# Patient Record
Sex: MALE | Race: OTHER
[De-identification: names, ages, dates, MRNs, and addresses within clinical notes are randomized per-mention and may not be internally consistent; named-entity substitution may affect disease eponyms.]

---

## 2019-07-03 ENCOUNTER — HOSPITAL ENCOUNTER (EMERGENCY)
Dept: HOSPITAL 15 - ER | Age: 84
LOS: 1 days | Discharge: HOME | End: 2019-07-04
Payer: MEDICAID

## 2019-07-03 VITALS — BODY MASS INDEX: 25.77 KG/M2 | HEIGHT: 70 IN | WEIGHT: 180 LBS

## 2019-07-03 DIAGNOSIS — I10: ICD-10-CM

## 2019-07-03 DIAGNOSIS — E78.5: ICD-10-CM

## 2019-07-03 DIAGNOSIS — Z79.899: ICD-10-CM

## 2019-07-03 DIAGNOSIS — Z90.49: ICD-10-CM

## 2019-07-03 DIAGNOSIS — Z79.82: ICD-10-CM

## 2019-07-03 DIAGNOSIS — E11.65: ICD-10-CM

## 2019-07-03 DIAGNOSIS — N39.0: Primary | ICD-10-CM

## 2019-07-03 LAB
ALBUMIN SERPL-MCNC: 3.4 G/DL (ref 3.4–5)
ALP SERPL-CCNC: 67 U/L (ref 45–117)
ALT SERPL-CCNC: 24 U/L (ref 16–61)
ANION GAP SERPL CALCULATED.3IONS-SCNC: 8 MMOL/L (ref 5–15)
BILIRUB SERPL-MCNC: 0.2 MG/DL (ref 0.2–1)
BUN SERPL-MCNC: 71 MG/DL (ref 7–18)
BUN/CREAT SERPL: 27.5
CALCIUM SERPL-MCNC: 9.2 MG/DL (ref 8.5–10.1)
CHLORIDE SERPL-SCNC: 95 MMOL/L (ref 98–107)
CO2 SERPL-SCNC: 30 MMOL/L (ref 21–32)
GLUCOSE SERPL-MCNC: 259 MG/DL (ref 74–106)
HCT VFR BLD AUTO: 39.6 % (ref 41–53)
HGB BLD-MCNC: 12.7 G/DL (ref 13.5–17.5)
MCH RBC QN AUTO: 26.3 PG (ref 28–32)
MCV RBC AUTO: 82.2 FL (ref 80–100)
NRBC BLD QL AUTO: 0 %
POTASSIUM SERPL-SCNC: 4.9 MMOL/L (ref 3.5–5.1)
PROT SERPL-MCNC: 8.5 G/DL (ref 6.4–8.2)
SODIUM SERPL-SCNC: 133 MMOL/L (ref 136–145)
WBC CLUMPS UR QL AUTO: PRESENT /HPF

## 2019-07-03 PROCEDURE — 99284 EMERGENCY DEPT VISIT MOD MDM: CPT

## 2019-07-03 PROCEDURE — 80053 COMPREHEN METABOLIC PANEL: CPT

## 2019-07-03 PROCEDURE — 74176 CT ABD & PELVIS W/O CONTRAST: CPT

## 2019-07-03 PROCEDURE — 81001 URINALYSIS AUTO W/SCOPE: CPT

## 2019-07-03 PROCEDURE — 36415 COLL VENOUS BLD VENIPUNCTURE: CPT

## 2019-07-03 PROCEDURE — 96365 THER/PROPH/DIAG IV INF INIT: CPT

## 2019-07-03 PROCEDURE — 85025 COMPLETE CBC W/AUTO DIFF WBC: CPT

## 2019-07-04 VITALS — DIASTOLIC BLOOD PRESSURE: 62 MMHG | SYSTOLIC BLOOD PRESSURE: 146 MMHG

## 2019-08-09 ENCOUNTER — HOSPITAL ENCOUNTER (INPATIENT)
Dept: HOSPITAL 15 - ER | Age: 84
LOS: 3 days | Discharge: HOME HEALTH | DRG: 720 | End: 2019-08-12
Attending: INTERNAL MEDICINE | Admitting: INTERNAL MEDICINE
Payer: MEDICAID

## 2019-08-09 VITALS — BODY MASS INDEX: 24.62 KG/M2 | HEIGHT: 70 IN | WEIGHT: 171.96 LBS

## 2019-08-09 VITALS — SYSTOLIC BLOOD PRESSURE: 144 MMHG | DIASTOLIC BLOOD PRESSURE: 69 MMHG

## 2019-08-09 DIAGNOSIS — Z99.81: ICD-10-CM

## 2019-08-09 DIAGNOSIS — E11.21: ICD-10-CM

## 2019-08-09 DIAGNOSIS — E78.5: ICD-10-CM

## 2019-08-09 DIAGNOSIS — N17.0: ICD-10-CM

## 2019-08-09 DIAGNOSIS — J96.10: ICD-10-CM

## 2019-08-09 DIAGNOSIS — I50.9: ICD-10-CM

## 2019-08-09 DIAGNOSIS — K74.60: ICD-10-CM

## 2019-08-09 DIAGNOSIS — I13.0: ICD-10-CM

## 2019-08-09 DIAGNOSIS — E11.22: ICD-10-CM

## 2019-08-09 DIAGNOSIS — F32.9: ICD-10-CM

## 2019-08-09 DIAGNOSIS — Z95.0: ICD-10-CM

## 2019-08-09 DIAGNOSIS — K21.9: ICD-10-CM

## 2019-08-09 DIAGNOSIS — N40.0: ICD-10-CM

## 2019-08-09 DIAGNOSIS — I25.10: ICD-10-CM

## 2019-08-09 DIAGNOSIS — D63.8: ICD-10-CM

## 2019-08-09 DIAGNOSIS — B96.20: ICD-10-CM

## 2019-08-09 DIAGNOSIS — N18.4: ICD-10-CM

## 2019-08-09 DIAGNOSIS — Z90.49: ICD-10-CM

## 2019-08-09 DIAGNOSIS — E11.65: ICD-10-CM

## 2019-08-09 DIAGNOSIS — N39.0: ICD-10-CM

## 2019-08-09 DIAGNOSIS — A41.9: Primary | ICD-10-CM

## 2019-08-09 DIAGNOSIS — G93.41: ICD-10-CM

## 2019-08-09 LAB
ALBUMIN SERPL-MCNC: 2.6 G/DL (ref 3.4–5)
ALCOHOL, URINE: < 3 MG/DL (ref 0–5)
ALP SERPL-CCNC: 71 U/L (ref 45–117)
ALT SERPL-CCNC: 15 U/L (ref 16–61)
AMPHETAMINES UR QL SCN: NEGATIVE
ANION GAP SERPL CALCULATED.3IONS-SCNC: 9 MMOL/L (ref 5–15)
APTT PPP: 30.8 SEC (ref 23.64–32.05)
BARBITURATES UR QL SCN: NEGATIVE
BENZODIAZ UR QL SCN: NEGATIVE
BILIRUB SERPL-MCNC: 0.3 MG/DL (ref 0.2–1)
BUN SERPL-MCNC: 73 MG/DL (ref 7–18)
BUN/CREAT SERPL: 28.9
BZE UR QL SCN: NEGATIVE
CALCIUM SERPL-MCNC: 8.4 MG/DL (ref 8.5–10.1)
CANNABINOIDS UR QL SCN: NEGATIVE
CHLORIDE SERPL-SCNC: 101 MMOL/L (ref 98–107)
CO2 SERPL-SCNC: 22 MMOL/L (ref 21–32)
GLUCOSE SERPL-MCNC: 306 MG/DL (ref 74–106)
HCT VFR BLD AUTO: 34.7 % (ref 41–53)
HGB BLD-MCNC: 11.1 G/DL (ref 13.5–17.5)
INR PPP: 1.03 (ref 0.9–1.15)
MAGNESIUM SERPL-MCNC: 2.4 MG/DL (ref 1.6–2.6)
MCH RBC QN AUTO: 26 PG (ref 28–32)
MCV RBC AUTO: 81.2 FL (ref 80–100)
NRBC BLD QL AUTO: 0 %
OPIATES UR QL SCN: NEGATIVE
PCP UR QL SCN: NEGATIVE
POTASSIUM SERPL-SCNC: 4.1 MMOL/L (ref 3.5–5.1)
PROT SERPL-MCNC: 7.7 G/DL (ref 6.4–8.2)
SODIUM SERPL-SCNC: 132 MMOL/L (ref 136–145)
WBC CLUMPS UR QL AUTO: PRESENT /HPF

## 2019-08-09 PROCEDURE — 70450 CT HEAD/BRAIN W/O DYE: CPT

## 2019-08-09 PROCEDURE — 83735 ASSAY OF MAGNESIUM: CPT

## 2019-08-09 PROCEDURE — 85730 THROMBOPLASTIN TIME PARTIAL: CPT

## 2019-08-09 PROCEDURE — 36415 COLL VENOUS BLD VENIPUNCTURE: CPT

## 2019-08-09 PROCEDURE — 83605 ASSAY OF LACTIC ACID: CPT

## 2019-08-09 PROCEDURE — 80048 BASIC METABOLIC PNL TOTAL CA: CPT

## 2019-08-09 PROCEDURE — 87040 BLOOD CULTURE FOR BACTERIA: CPT

## 2019-08-09 PROCEDURE — 81001 URINALYSIS AUTO W/SCOPE: CPT

## 2019-08-09 PROCEDURE — 71045 X-RAY EXAM CHEST 1 VIEW: CPT

## 2019-08-09 PROCEDURE — 80307 DRUG TEST PRSMV CHEM ANLYZR: CPT

## 2019-08-09 PROCEDURE — 84484 ASSAY OF TROPONIN QUANT: CPT

## 2019-08-09 PROCEDURE — 93005 ELECTROCARDIOGRAM TRACING: CPT

## 2019-08-09 PROCEDURE — 82962 GLUCOSE BLOOD TEST: CPT

## 2019-08-09 PROCEDURE — 80053 COMPREHEN METABOLIC PANEL: CPT

## 2019-08-09 PROCEDURE — 85610 PROTHROMBIN TIME: CPT

## 2019-08-09 PROCEDURE — 87086 URINE CULTURE/COLONY COUNT: CPT

## 2019-08-09 PROCEDURE — 85025 COMPLETE CBC W/AUTO DIFF WBC: CPT

## 2019-08-09 RX ADMIN — SODIUM CHLORIDE SCH MLS/HR: 0.9 INJECTION, SOLUTION INTRAVENOUS at 18:52

## 2019-08-09 RX ADMIN — HUMAN INSULIN SCH UNITS: 100 INJECTION, SOLUTION SUBCUTANEOUS at 21:17

## 2019-08-09 RX ADMIN — GABAPENTIN SCH MG: 300 CAPSULE ORAL at 21:51

## 2019-08-09 RX ADMIN — Medication SCH STRIP: at 21:18

## 2019-08-09 RX ADMIN — DOXAZOSIN SCH MG: 2 TABLET ORAL at 21:51

## 2019-08-09 NOTE — NUR
Telemetry admit from JOCELYNE COMBS admitted to Telemetry unit after NO SBAR was received. Patient 
oriented to FRITZ BERRY, RN primary RN, unit, room, bed, and unit policies regarding 
patient care and visiting hours. Patient now on continuous telemetry monitoring, tele box # 
48 and telemetry reading on arrival to unit is SINUS RHYTHM. Patient placed on bedside 
oxygen 4 L/MIN, weighed by bedscale and encouraged to call if they need something. Family is 
at bedside.

Patient is A/O x3, he is unaware of the time and verbalized that it was the year 2000 and 
our president was a gomez. No S/S/of distress, SOB, or pain. Skin is generalized intact, 
there is a wound on the great left toe, family states it has been there since January and he 
has taken antibiotics for it off and on. Accucheck was 321 to be covered with 12 units of 
insulin on the moderate scale. VSS WNL. 

Call light is within reach, side rails up x2, bed is in lowest position, BED ALARM IS ON, 
urinal and bedside commode at bedside. 

All questions and concerns addressed, patient verbalized understanding.

## 2019-08-10 VITALS — SYSTOLIC BLOOD PRESSURE: 115 MMHG | DIASTOLIC BLOOD PRESSURE: 50 MMHG

## 2019-08-10 VITALS — SYSTOLIC BLOOD PRESSURE: 125 MMHG | DIASTOLIC BLOOD PRESSURE: 62 MMHG

## 2019-08-10 LAB
ALBUMIN SERPL-MCNC: 2.2 G/DL (ref 3.4–5)
ALP SERPL-CCNC: 70 U/L (ref 45–117)
ALT SERPL-CCNC: 13 U/L (ref 16–61)
ANION GAP SERPL CALCULATED.3IONS-SCNC: 8 MMOL/L (ref 5–15)
BILIRUB SERPL-MCNC: 0.3 MG/DL (ref 0.2–1)
BUN SERPL-MCNC: 62 MG/DL (ref 7–18)
BUN/CREAT SERPL: 28.4
CALCIUM SERPL-MCNC: 8.1 MG/DL (ref 8.5–10.1)
CHLORIDE SERPL-SCNC: 106 MMOL/L (ref 98–107)
CO2 SERPL-SCNC: 26 MMOL/L (ref 21–32)
GLUCOSE SERPL-MCNC: 102 MG/DL (ref 74–106)
HCT VFR BLD AUTO: 31.2 % (ref 41–53)
HGB BLD-MCNC: 10.3 G/DL (ref 13.5–17.5)
MCH RBC QN AUTO: 26.4 PG (ref 28–32)
MCV RBC AUTO: 80 FL (ref 80–100)
NRBC BLD QL AUTO: 0 %
POTASSIUM SERPL-SCNC: 3.3 MMOL/L (ref 3.5–5.1)
PROT SERPL-MCNC: 6.8 G/DL (ref 6.4–8.2)
SODIUM SERPL-SCNC: 140 MMOL/L (ref 136–145)

## 2019-08-10 RX ADMIN — DOXAZOSIN SCH MG: 2 TABLET ORAL at 21:39

## 2019-08-10 RX ADMIN — HUMAN INSULIN SCH UNITS: 100 INJECTION, SOLUTION SUBCUTANEOUS at 19:34

## 2019-08-10 RX ADMIN — HUMAN INSULIN SCH UNITS: 100 INJECTION, SOLUTION SUBCUTANEOUS at 12:45

## 2019-08-10 RX ADMIN — HUMAN INSULIN SCH UNITS: 100 INJECTION, SOLUTION SUBCUTANEOUS at 15:51

## 2019-08-10 RX ADMIN — Medication SCH STRIP: at 12:45

## 2019-08-10 RX ADMIN — Medication SCH STRIP: at 00:19

## 2019-08-10 RX ADMIN — Medication SCH STRIP: at 04:22

## 2019-08-10 RX ADMIN — Medication SCH STRIP: at 19:33

## 2019-08-10 RX ADMIN — Medication SCH STRIP: at 07:44

## 2019-08-10 RX ADMIN — HUMAN INSULIN SCH UNITS: 100 INJECTION, SOLUTION SUBCUTANEOUS at 04:00

## 2019-08-10 RX ADMIN — HUMAN INSULIN SCH UNITS: 100 INJECTION, SOLUTION SUBCUTANEOUS at 00:18

## 2019-08-10 RX ADMIN — CEFTRIAXONE SODIUM SCH MLS/HR: 1 INJECTION, POWDER, FOR SOLUTION INTRAMUSCULAR; INTRAVENOUS at 09:29

## 2019-08-10 RX ADMIN — SODIUM CHLORIDE SCH MLS/HR: 0.9 INJECTION, SOLUTION INTRAVENOUS at 14:05

## 2019-08-10 RX ADMIN — SODIUM CHLORIDE SCH MLS/HR: 0.9 INJECTION, SOLUTION INTRAVENOUS at 23:02

## 2019-08-10 RX ADMIN — PANTOPRAZOLE SODIUM SCH MG: 40 TABLET, DELAYED RELEASE ORAL at 09:30

## 2019-08-10 RX ADMIN — SODIUM CHLORIDE SCH MLS/HR: 0.9 INJECTION, SOLUTION INTRAVENOUS at 04:10

## 2019-08-10 RX ADMIN — ASPIRIN SCH MG: 81 TABLET ORAL at 09:29

## 2019-08-10 RX ADMIN — ATORVASTATIN CALCIUM SCH MG: 20 TABLET, FILM COATED ORAL at 18:23

## 2019-08-10 RX ADMIN — Medication SCH STRIP: at 15:50

## 2019-08-10 RX ADMIN — HUMAN INSULIN SCH UNITS: 100 INJECTION, SOLUTION SUBCUTANEOUS at 07:44

## 2019-08-10 RX ADMIN — GABAPENTIN SCH MG: 300 CAPSULE ORAL at 09:30

## 2019-08-10 NOTE — NUR
RECEIVED PATIENT FROM DAY SHIFT RN. PATIENT RESTING IN BED. NO S/S OF DISTRESS NOTED. DENIED 
PAIN FOR NOW.  POC INSTRUCTED AND ENCOURAGED PATIENT TO CALL FOR ASSISTANT IF NEEDED. BED IN 
LOWEST POSITION WITH SIDE RAILS UP X 2. CALL BELL WITHIN REACH. ALARM ON. CONTINUE TO 
MONITOR FOR CHANGES Q1H AND PRN.

## 2019-08-11 VITALS — DIASTOLIC BLOOD PRESSURE: 63 MMHG | SYSTOLIC BLOOD PRESSURE: 113 MMHG

## 2019-08-11 VITALS — DIASTOLIC BLOOD PRESSURE: 66 MMHG | SYSTOLIC BLOOD PRESSURE: 121 MMHG

## 2019-08-11 VITALS — SYSTOLIC BLOOD PRESSURE: 138 MMHG | DIASTOLIC BLOOD PRESSURE: 64 MMHG

## 2019-08-11 VITALS — SYSTOLIC BLOOD PRESSURE: 126 MMHG | DIASTOLIC BLOOD PRESSURE: 63 MMHG

## 2019-08-11 RX ADMIN — HUMAN INSULIN SCH UNITS: 100 INJECTION, SOLUTION SUBCUTANEOUS at 03:49

## 2019-08-11 RX ADMIN — HUMAN INSULIN SCH UNITS: 100 INJECTION, SOLUTION SUBCUTANEOUS at 08:00

## 2019-08-11 RX ADMIN — Medication SCH STRIP: at 03:49

## 2019-08-11 RX ADMIN — GABAPENTIN SCH MG: 300 CAPSULE ORAL at 09:33

## 2019-08-11 RX ADMIN — DOXAZOSIN SCH MG: 2 TABLET ORAL at 22:07

## 2019-08-11 RX ADMIN — SODIUM CHLORIDE SCH MLS/HR: 0.9 INJECTION, SOLUTION INTRAVENOUS at 11:34

## 2019-08-11 RX ADMIN — Medication SCH STRIP: at 11:34

## 2019-08-11 RX ADMIN — TEMAZEPAM PRN MG: 15 CAPSULE ORAL at 00:29

## 2019-08-11 RX ADMIN — CEFTRIAXONE SODIUM SCH MLS/HR: 1 INJECTION, POWDER, FOR SOLUTION INTRAMUSCULAR; INTRAVENOUS at 08:30

## 2019-08-11 RX ADMIN — Medication SCH STRIP: at 17:16

## 2019-08-11 RX ADMIN — PANTOPRAZOLE SODIUM SCH MG: 40 TABLET, DELAYED RELEASE ORAL at 09:32

## 2019-08-11 RX ADMIN — SODIUM CHLORIDE SCH MLS/HR: 0.9 INJECTION, SOLUTION INTRAVENOUS at 15:15

## 2019-08-11 RX ADMIN — HUMAN INSULIN SCH UNITS: 100 INJECTION, SOLUTION SUBCUTANEOUS at 00:00

## 2019-08-11 RX ADMIN — HUMAN INSULIN SCH UNITS: 100 INJECTION, SOLUTION SUBCUTANEOUS at 17:16

## 2019-08-11 RX ADMIN — TEMAZEPAM PRN MG: 15 CAPSULE ORAL at 22:17

## 2019-08-11 RX ADMIN — ATORVASTATIN CALCIUM SCH MG: 20 TABLET, FILM COATED ORAL at 17:15

## 2019-08-11 RX ADMIN — ASPIRIN SCH MG: 81 TABLET ORAL at 09:33

## 2019-08-11 RX ADMIN — Medication SCH STRIP: at 08:30

## 2019-08-11 RX ADMIN — HUMAN INSULIN SCH UNITS: 100 INJECTION, SOLUTION SUBCUTANEOUS at 11:34

## 2019-08-11 RX ADMIN — Medication SCH STRIP: at 00:28

## 2019-08-11 NOTE — NUR
Opening Note

Received report from night shift RN. Patient is resting in bed with eyes closed, easy to 
wake by calling name. Patient denies pain at this time. Patient is on 4L NC, denies 
shortness of breath at this time.  Reviewed plan of care with patient, patient verbalized 
understanding. Bed in low and locked position, call light within reach. Will continue to 
monitor Q1 hour and PRN.

## 2019-08-11 NOTE — NUR
Closing Note

Report given to night shift RN. Family at bedside, no signs or symptoms of distress noted at 
this time.

## 2019-08-11 NOTE — NUR
RECEIVED PATIENT FROM DAY SHIFT RN. PATIENT RESTING IN BED. FAMILY AT BEDSIDE. NO S/S OF 
DISTRESS NOTED. DENIED PAIN FOR NOW. ASSISTED PATIENT TO BATHROOM. PATIENT HAD BM. POC 
INSTRUCTED AND ENCOURAGED PATIENT TO CALL FOR ASSISTANT IF NEEDED. BED IN LOWEST POSITION 
WITH SIDE RAILS UP X 2. CALL BELL WITHIN REACH. ALARM ON. CONTINUE TO MONITOR FOR CHANGES 
Q1H AND PRN.

## 2019-08-11 NOTE — NUR
Abdominal Pain

Patient complains of abdominal pain 8/10, and is requesting pain medications. Will medicate 
per orders.

## 2019-08-11 NOTE — NUR
Patient ambulated 

Patient ambulated to restroom, standby assistance and walker. Patient tolerated well. Will 
continue to monitor Q1 hour and PRN.

## 2019-08-12 VITALS — SYSTOLIC BLOOD PRESSURE: 137 MMHG | DIASTOLIC BLOOD PRESSURE: 73 MMHG

## 2019-08-12 VITALS — SYSTOLIC BLOOD PRESSURE: 125 MMHG | DIASTOLIC BLOOD PRESSURE: 51 MMHG

## 2019-08-12 VITALS — DIASTOLIC BLOOD PRESSURE: 75 MMHG | SYSTOLIC BLOOD PRESSURE: 135 MMHG

## 2019-08-12 VITALS — DIASTOLIC BLOOD PRESSURE: 73 MMHG | SYSTOLIC BLOOD PRESSURE: 137 MMHG

## 2019-08-12 LAB
ANION GAP SERPL CALCULATED.3IONS-SCNC: 6 MMOL/L (ref 5–15)
BUN SERPL-MCNC: 48 MG/DL (ref 7–18)
BUN/CREAT SERPL: 27.9
CALCIUM SERPL-MCNC: 8.2 MG/DL (ref 8.5–10.1)
CHLORIDE SERPL-SCNC: 112 MMOL/L (ref 98–107)
CO2 SERPL-SCNC: 24 MMOL/L (ref 21–32)
GLUCOSE SERPL-MCNC: 93 MG/DL (ref 74–106)
HCT VFR BLD AUTO: 31.4 % (ref 41–53)
HGB BLD-MCNC: 10.1 G/DL (ref 13.5–17.5)
MCH RBC QN AUTO: 26.3 PG (ref 28–32)
MCV RBC AUTO: 81.6 FL (ref 80–100)
NRBC BLD QL AUTO: 0.1 %
POTASSIUM SERPL-SCNC: 3.9 MMOL/L (ref 3.5–5.1)
SODIUM SERPL-SCNC: 142 MMOL/L (ref 136–145)

## 2019-08-12 RX ADMIN — ATORVASTATIN CALCIUM SCH MG: 20 TABLET, FILM COATED ORAL at 17:57

## 2019-08-12 RX ADMIN — GABAPENTIN SCH MG: 300 CAPSULE ORAL at 09:42

## 2019-08-12 RX ADMIN — ASPIRIN SCH MG: 81 TABLET ORAL at 09:43

## 2019-08-12 RX ADMIN — Medication SCH STRIP: at 12:04

## 2019-08-12 RX ADMIN — HUMAN INSULIN SCH UNITS: 100 INJECTION, SOLUTION SUBCUTANEOUS at 00:14

## 2019-08-12 RX ADMIN — Medication SCH STRIP: at 05:54

## 2019-08-12 RX ADMIN — HUMAN INSULIN SCH UNITS: 100 INJECTION, SOLUTION SUBCUTANEOUS at 17:57

## 2019-08-12 RX ADMIN — SODIUM CHLORIDE SCH MLS/HR: 0.9 INJECTION, SOLUTION INTRAVENOUS at 09:04

## 2019-08-12 RX ADMIN — Medication SCH STRIP: at 00:13

## 2019-08-12 RX ADMIN — CEFTRIAXONE SODIUM SCH MLS/HR: 1 INJECTION, POWDER, FOR SOLUTION INTRAMUSCULAR; INTRAVENOUS at 09:04

## 2019-08-12 RX ADMIN — HUMAN INSULIN SCH UNITS: 100 INJECTION, SOLUTION SUBCUTANEOUS at 12:03

## 2019-08-12 RX ADMIN — PANTOPRAZOLE SODIUM SCH MG: 40 TABLET, DELAYED RELEASE ORAL at 09:42

## 2019-08-12 RX ADMIN — Medication SCH STRIP: at 17:57

## 2019-08-12 RX ADMIN — HUMAN INSULIN SCH UNITS: 100 INJECTION, SOLUTION SUBCUTANEOUS at 05:54

## 2019-08-12 NOTE — NUR
Dr. Craig at bedside

Reviewing plan of care with patient and family, using a . Daughters and patient 
are refusing rehab after discharge. Both daughters state they want the patient to discharge 
home and they will care for him there. Educated by the doctor the risks of going home, 
family and patient verbalized understanding. Will continue to monitor

## 2019-08-12 NOTE — NUR
Per Marycruz Morin, [facility] is responsible for HH for this pt, faxed ss order to [facility]

## 2019-08-12 NOTE — NUR
Discharge

Discharge instructions given as ordered. Encourage to follow up with PMD as instructed. All 
questions and concerns addressed. Patient verbalized understanding.  Medication 
reconciliation form completed and copy given to patient.  IV catheter removed, catheter 
intact, pressure dressing applied. New prescriptions given to family member. Telemetry 
monitor removed and sent back to [facility]. Patient proved with taxi voucher. Patient taken via 
wheelchair with all personal belongings, accompanied by staff member and family members. 
Patient on 4L NC. No signs or symptoms of distress noted at this time.

## 2019-08-12 NOTE — NUR
Per SS consult, patient has an order for home PT. Referral faxed, and per Tasia at 
[facility], they are willing to accept this case and start of care will be 24-48 hours upon 
discharge. Auth is pending from [facility]. 

-------------------------------------------------------------------------------

Addendum: 08/12/19 at 1656 by ASIYA CAREY SS

-------------------------------------------------------------------------------

Amended: Links added.

## 2019-08-12 NOTE — NUR
Opening Note

Received report from night shift RN. Patient is resting in bed, with eyes closed. Patient is 
on 4L NC, respirations even and unlabored. Bed in low and locked position, call light within 
reach. Will continue to monitor Q1 hour and PRN.

## 2019-08-12 NOTE — NUR
Per [facility], Marycruz lira responsible for HH.  I called Marycruz Morin and spoke to FRACISCO Almeida and he 
requested Arturo call him for auth.  I called Arturo and spoke to Tasia to call Marycruz Morin.  HH is arranged

## 2019-08-12 NOTE — NUR
Physical Therapy 

Patient ambulated in hallway with physical therapy, using a front wheel walker. Patient 
tolerated well

## 2019-08-12 NOTE — NUR
Spoke to Shabana from 

States [facility] will start tomorrow and it is ok to discharge patient at this 
time.

## 2020-05-09 ENCOUNTER — HOSPITAL ENCOUNTER (INPATIENT)
Dept: HOSPITAL 15 - ER | Age: 85
LOS: 6 days | Discharge: HOME | DRG: 133 | End: 2020-05-15
Attending: INTERNAL MEDICINE | Admitting: HOSPITALIST
Payer: MEDICAID

## 2020-05-09 VITALS — BODY MASS INDEX: 26.12 KG/M2 | HEIGHT: 67 IN | WEIGHT: 166.45 LBS

## 2020-05-09 DIAGNOSIS — J98.11: ICD-10-CM

## 2020-05-09 DIAGNOSIS — J44.1: ICD-10-CM

## 2020-05-09 DIAGNOSIS — E87.1: ICD-10-CM

## 2020-05-09 DIAGNOSIS — Z95.0: ICD-10-CM

## 2020-05-09 DIAGNOSIS — J96.21: Primary | ICD-10-CM

## 2020-05-09 DIAGNOSIS — E11.65: ICD-10-CM

## 2020-05-09 DIAGNOSIS — I50.33: ICD-10-CM

## 2020-05-09 DIAGNOSIS — N30.20: ICD-10-CM

## 2020-05-09 DIAGNOSIS — Z79.899: ICD-10-CM

## 2020-05-09 DIAGNOSIS — J44.0: ICD-10-CM

## 2020-05-09 DIAGNOSIS — Z80.9: ICD-10-CM

## 2020-05-09 DIAGNOSIS — J12.9: ICD-10-CM

## 2020-05-09 DIAGNOSIS — E11.22: ICD-10-CM

## 2020-05-09 DIAGNOSIS — Z03.818: ICD-10-CM

## 2020-05-09 DIAGNOSIS — N18.6: ICD-10-CM

## 2020-05-09 DIAGNOSIS — Z80.8: ICD-10-CM

## 2020-05-09 DIAGNOSIS — N30.00: ICD-10-CM

## 2020-05-09 DIAGNOSIS — J15.6: ICD-10-CM

## 2020-05-09 DIAGNOSIS — D63.1: ICD-10-CM

## 2020-05-09 DIAGNOSIS — I13.2: ICD-10-CM

## 2020-05-09 DIAGNOSIS — Z79.82: ICD-10-CM

## 2020-05-09 DIAGNOSIS — E87.5: ICD-10-CM

## 2020-05-09 DIAGNOSIS — I27.21: ICD-10-CM

## 2020-05-09 PROCEDURE — 85025 COMPLETE CBC W/AUTO DIFF WBC: CPT

## 2020-05-09 PROCEDURE — 71045 X-RAY EXAM CHEST 1 VIEW: CPT

## 2020-05-09 PROCEDURE — 78452 HT MUSCLE IMAGE SPECT MULT: CPT

## 2020-05-09 PROCEDURE — 85730 THROMBOPLASTIN TIME PARTIAL: CPT

## 2020-05-09 PROCEDURE — 87081 CULTURE SCREEN ONLY: CPT

## 2020-05-09 PROCEDURE — 82962 GLUCOSE BLOOD TEST: CPT

## 2020-05-09 PROCEDURE — 96375 TX/PRO/DX INJ NEW DRUG ADDON: CPT

## 2020-05-09 PROCEDURE — 87086 URINE CULTURE/COLONY COUNT: CPT

## 2020-05-09 PROCEDURE — 93970 EXTREMITY STUDY: CPT

## 2020-05-09 PROCEDURE — 96366 THER/PROPH/DIAG IV INF ADDON: CPT

## 2020-05-09 PROCEDURE — 86141 C-REACTIVE PROTEIN HS: CPT

## 2020-05-09 PROCEDURE — 80053 COMPREHEN METABOLIC PANEL: CPT

## 2020-05-09 PROCEDURE — 71250 CT THORAX DX C-: CPT

## 2020-05-09 PROCEDURE — 85610 PROTHROMBIN TIME: CPT

## 2020-05-09 PROCEDURE — 80048 BASIC METABOLIC PNL TOTAL CA: CPT

## 2020-05-09 PROCEDURE — 82140 ASSAY OF AMMONIA: CPT

## 2020-05-09 PROCEDURE — 82728 ASSAY OF FERRITIN: CPT

## 2020-05-09 PROCEDURE — 81001 URINALYSIS AUTO W/SCOPE: CPT

## 2020-05-09 PROCEDURE — 85018 HEMOGLOBIN: CPT

## 2020-05-09 PROCEDURE — 96372 THER/PROPH/DIAG INJ SC/IM: CPT

## 2020-05-09 PROCEDURE — 96365 THER/PROPH/DIAG IV INF INIT: CPT

## 2020-05-09 PROCEDURE — 93306 TTE W/DOPPLER COMPLETE: CPT

## 2020-05-09 PROCEDURE — 85379 FIBRIN DEGRADATION QUANT: CPT

## 2020-05-09 PROCEDURE — 94640 AIRWAY INHALATION TREATMENT: CPT

## 2020-05-09 PROCEDURE — 90935 HEMODIALYSIS ONE EVALUATION: CPT

## 2020-05-09 PROCEDURE — 74177 CT ABD & PELVIS W/CONTRAST: CPT

## 2020-05-09 PROCEDURE — 87880 STREP A ASSAY W/OPTIC: CPT

## 2020-05-09 PROCEDURE — 93005 ELECTROCARDIOGRAM TRACING: CPT

## 2020-05-09 PROCEDURE — 83036 HEMOGLOBIN GLYCOSYLATED A1C: CPT

## 2020-05-09 PROCEDURE — 83880 ASSAY OF NATRIURETIC PEPTIDE: CPT

## 2020-05-09 PROCEDURE — 84484 ASSAY OF TROPONIN QUANT: CPT

## 2020-05-09 PROCEDURE — 87040 BLOOD CULTURE FOR BACTERIA: CPT

## 2020-05-09 PROCEDURE — 83615 LACTATE (LD) (LDH) ENZYME: CPT

## 2020-05-09 PROCEDURE — 72125 CT NECK SPINE W/O DYE: CPT

## 2020-05-09 PROCEDURE — 83605 ASSAY OF LACTIC ACID: CPT

## 2020-05-09 PROCEDURE — 83735 ASSAY OF MAGNESIUM: CPT

## 2020-05-09 PROCEDURE — 36415 COLL VENOUS BLD VENIPUNCTURE: CPT

## 2020-05-09 PROCEDURE — 71260 CT THORAX DX C+: CPT

## 2020-05-09 PROCEDURE — 87070 CULTURE OTHR SPECIMN AEROBIC: CPT

## 2020-05-09 PROCEDURE — 87804 INFLUENZA ASSAY W/OPTIC: CPT

## 2020-05-09 PROCEDURE — 93017 CV STRESS TEST TRACING ONLY: CPT

## 2020-05-09 PROCEDURE — 97163 PT EVAL HIGH COMPLEX 45 MIN: CPT

## 2020-05-09 PROCEDURE — 70450 CT HEAD/BRAIN W/O DYE: CPT

## 2020-05-10 VITALS — DIASTOLIC BLOOD PRESSURE: 50 MMHG | SYSTOLIC BLOOD PRESSURE: 122 MMHG

## 2020-05-10 VITALS — SYSTOLIC BLOOD PRESSURE: 114 MMHG | DIASTOLIC BLOOD PRESSURE: 51 MMHG

## 2020-05-10 VITALS — SYSTOLIC BLOOD PRESSURE: 114 MMHG | DIASTOLIC BLOOD PRESSURE: 56 MMHG

## 2020-05-10 VITALS — SYSTOLIC BLOOD PRESSURE: 109 MMHG | DIASTOLIC BLOOD PRESSURE: 49 MMHG

## 2020-05-10 LAB
ALBUMIN SERPL-MCNC: 2.2 G/DL (ref 3.4–5)
ALP SERPL-CCNC: 58 U/L (ref 45–117)
ALT SERPL-CCNC: 31 U/L (ref 16–61)
ANION GAP SERPL CALCULATED.3IONS-SCNC: 8 MMOL/L (ref 5–15)
ANION GAP SERPL CALCULATED.3IONS-SCNC: 8 MMOL/L (ref 5–15)
APTT PPP: 28.8 SEC (ref 23.64–32.05)
BILIRUB SERPL-MCNC: 0.4 MG/DL (ref 0.2–1)
BUN SERPL-MCNC: 43 MG/DL (ref 7–18)
BUN SERPL-MCNC: 44 MG/DL (ref 7–18)
BUN/CREAT SERPL: 10.1
BUN/CREAT SERPL: 9.7
CALCIUM SERPL-MCNC: 7.8 MG/DL (ref 8.5–10.1)
CALCIUM SERPL-MCNC: 7.8 MG/DL (ref 8.5–10.1)
CHLORIDE SERPL-SCNC: 94 MMOL/L (ref 98–107)
CHLORIDE SERPL-SCNC: 96 MMOL/L (ref 98–107)
CO2 SERPL-SCNC: 27 MMOL/L (ref 21–32)
CO2 SERPL-SCNC: 28 MMOL/L (ref 21–32)
GLUCOSE SERPL-MCNC: 105 MG/DL (ref 74–106)
GLUCOSE SERPL-MCNC: 166 MG/DL (ref 74–106)
HCT VFR BLD AUTO: 28.3 % (ref 41–53)
HCT VFR BLD AUTO: 29 % (ref 41–53)
HGB BLD-MCNC: 8.9 G/DL (ref 13.5–17.5)
HGB BLD-MCNC: 9.1 G/DL (ref 13.5–17.5)
INR PPP: 1.18 (ref 0.9–1.15)
MCH RBC QN AUTO: 27 PG (ref 28–32)
MCH RBC QN AUTO: 27.7 PG (ref 28–32)
MCV RBC AUTO: 85.6 FL (ref 80–100)
MCV RBC AUTO: 88 FL (ref 80–100)
NRBC BLD QL AUTO: 0 %
NRBC BLD QL AUTO: 0.1 %
POTASSIUM SERPL-SCNC: 4.1 MMOL/L (ref 3.5–5.1)
POTASSIUM SERPL-SCNC: 4.3 MMOL/L (ref 3.5–5.1)
PROT SERPL-MCNC: 6.8 G/DL (ref 6.4–8.2)
SODIUM SERPL-SCNC: 130 MMOL/L (ref 136–145)
SODIUM SERPL-SCNC: 131 MMOL/L (ref 136–145)
WBC CLUMPS UR QL AUTO: PRESENT /HPF

## 2020-05-10 RX ADMIN — GABAPENTIN SCH MG: 300 CAPSULE ORAL at 09:41

## 2020-05-10 RX ADMIN — GEMFIBROZIL SCH MG: 600 TABLET, FILM COATED ORAL at 21:20

## 2020-05-10 RX ADMIN — ATORVASTATIN CALCIUM SCH MG: 20 TABLET, FILM COATED ORAL at 21:20

## 2020-05-10 RX ADMIN — Medication SCH STRIP: at 17:16

## 2020-05-10 RX ADMIN — FUROSEMIDE SCH MG: 40 TABLET ORAL at 09:42

## 2020-05-10 RX ADMIN — ALBUTEROL SULFATE SCH CAP: 90 AEROSOL, METERED RESPIRATORY (INHALATION) at 10:00

## 2020-05-10 RX ADMIN — HUMAN INSULIN SCH UNITS: 100 INJECTION, SOLUTION SUBCUTANEOUS at 17:17

## 2020-05-10 RX ADMIN — DOXAZOSIN SCH MG: 2 TABLET ORAL at 21:21

## 2020-05-10 RX ADMIN — Medication SCH STRIP: at 12:30

## 2020-05-10 RX ADMIN — HUMAN INSULIN SCH UNITS: 100 INJECTION, SOLUTION SUBCUTANEOUS at 12:29

## 2020-05-10 RX ADMIN — HUMAN INSULIN SCH UNITS: 100 INJECTION, SOLUTION SUBCUTANEOUS at 21:21

## 2020-05-10 RX ADMIN — ALBUTEROL SULFATE SCH CAP: 90 AEROSOL, METERED RESPIRATORY (INHALATION) at 20:52

## 2020-05-10 RX ADMIN — Medication SCH STRIP: at 21:20

## 2020-05-10 RX ADMIN — GEMFIBROZIL SCH MG: 600 TABLET, FILM COATED ORAL at 09:41

## 2020-05-10 RX ADMIN — GABAPENTIN SCH MG: 300 CAPSULE ORAL at 21:20

## 2020-05-10 RX ADMIN — ASPIRIN SCH MG: 81 TABLET ORAL at 09:38

## 2020-05-10 RX ADMIN — CEFTRIAXONE SODIUM SCH MLS/HR: 1 INJECTION, POWDER, FOR SOLUTION INTRAMUSCULAR; INTRAVENOUS at 11:13

## 2020-05-10 RX ADMIN — ENOXAPARIN SODIUM SCH MG: 30 INJECTION SUBCUTANEOUS at 09:44

## 2020-05-11 VITALS — SYSTOLIC BLOOD PRESSURE: 116 MMHG | DIASTOLIC BLOOD PRESSURE: 60 MMHG

## 2020-05-11 VITALS — SYSTOLIC BLOOD PRESSURE: 112 MMHG | DIASTOLIC BLOOD PRESSURE: 54 MMHG

## 2020-05-11 VITALS — SYSTOLIC BLOOD PRESSURE: 101 MMHG | DIASTOLIC BLOOD PRESSURE: 57 MMHG

## 2020-05-11 VITALS — SYSTOLIC BLOOD PRESSURE: 116 MMHG | DIASTOLIC BLOOD PRESSURE: 55 MMHG

## 2020-05-11 VITALS — SYSTOLIC BLOOD PRESSURE: 122 MMHG | DIASTOLIC BLOOD PRESSURE: 57 MMHG

## 2020-05-11 LAB
ALBUMIN SERPL-MCNC: 2.3 G/DL (ref 3.4–5)
ALP SERPL-CCNC: 60 U/L (ref 45–117)
ALT SERPL-CCNC: 25 U/L (ref 16–61)
ANION GAP SERPL CALCULATED.3IONS-SCNC: 9 MMOL/L (ref 5–15)
BILIRUB SERPL-MCNC: 0.4 MG/DL (ref 0.2–1)
BUN SERPL-MCNC: 60 MG/DL (ref 7–18)
BUN/CREAT SERPL: 12
CALCIUM SERPL-MCNC: 8.4 MG/DL (ref 8.5–10.1)
CHLORIDE SERPL-SCNC: 94 MMOL/L (ref 98–107)
CO2 SERPL-SCNC: 26 MMOL/L (ref 21–32)
GLUCOSE SERPL-MCNC: 153 MG/DL (ref 74–106)
HCT VFR BLD AUTO: 27.6 % (ref 41–53)
HGB BLD-MCNC: 9.4 G/DL (ref 13.5–17.5)
MCH RBC QN AUTO: 28.4 PG (ref 28–32)
MCV RBC AUTO: 83.8 FL (ref 80–100)
NRBC BLD QL AUTO: 0 %
POTASSIUM SERPL-SCNC: 5 MMOL/L (ref 3.5–5.1)
PROT SERPL-MCNC: 7 G/DL (ref 6.4–8.2)
SODIUM SERPL-SCNC: 129 MMOL/L (ref 136–145)

## 2020-05-11 PROCEDURE — 5A1D70Z PERFORMANCE OF URINARY FILTRATION, INTERMITTENT, LESS THAN 6 HOURS PER DAY: ICD-10-PCS | Performed by: INTERNAL MEDICINE

## 2020-05-11 RX ADMIN — ASPIRIN SCH MG: 81 TABLET ORAL at 10:24

## 2020-05-11 RX ADMIN — ENOXAPARIN SODIUM SCH MG: 30 INJECTION SUBCUTANEOUS at 10:25

## 2020-05-11 RX ADMIN — HUMAN INSULIN SCH UNITS: 100 INJECTION, SOLUTION SUBCUTANEOUS at 22:40

## 2020-05-11 RX ADMIN — ALBUTEROL SULFATE SCH CAP: 90 AEROSOL, METERED RESPIRATORY (INHALATION) at 22:00

## 2020-05-11 RX ADMIN — ALBUTEROL SULFATE SCH CAP: 90 AEROSOL, METERED RESPIRATORY (INHALATION) at 10:24

## 2020-05-11 RX ADMIN — DOXAZOSIN SCH MG: 2 TABLET ORAL at 22:32

## 2020-05-11 RX ADMIN — GEMFIBROZIL SCH MG: 600 TABLET, FILM COATED ORAL at 10:24

## 2020-05-11 RX ADMIN — GEMFIBROZIL SCH MG: 600 TABLET, FILM COATED ORAL at 22:32

## 2020-05-11 RX ADMIN — HUMAN INSULIN SCH UNITS: 100 INJECTION, SOLUTION SUBCUTANEOUS at 06:08

## 2020-05-11 RX ADMIN — Medication SCH STRIP: at 06:07

## 2020-05-11 RX ADMIN — ALBUTEROL SULFATE PRN MG: 2.5 SOLUTION RESPIRATORY (INHALATION) at 22:30

## 2020-05-11 RX ADMIN — FUROSEMIDE SCH MG: 40 TABLET ORAL at 10:24

## 2020-05-11 RX ADMIN — Medication SCH STRIP: at 17:39

## 2020-05-11 RX ADMIN — GABAPENTIN SCH MG: 300 CAPSULE ORAL at 10:25

## 2020-05-11 RX ADMIN — CEFTRIAXONE SODIUM SCH MLS/HR: 1 INJECTION, POWDER, FOR SOLUTION INTRAMUSCULAR; INTRAVENOUS at 11:12

## 2020-05-11 RX ADMIN — HUMAN INSULIN SCH UNITS: 100 INJECTION, SOLUTION SUBCUTANEOUS at 17:39

## 2020-05-11 RX ADMIN — AZITHROMYCIN MONOHYDRATE SCH MG: 250 TABLET ORAL at 10:25

## 2020-05-11 RX ADMIN — Medication SCH STRIP: at 22:33

## 2020-05-11 RX ADMIN — ATORVASTATIN CALCIUM SCH MG: 20 TABLET, FILM COATED ORAL at 22:32

## 2020-05-11 RX ADMIN — Medication SCH STRIP: at 13:05

## 2020-05-11 RX ADMIN — IPRATROPIUM BROMIDE PRN MG: 0.5 SOLUTION RESPIRATORY (INHALATION) at 22:31

## 2020-05-11 RX ADMIN — GABAPENTIN SCH MG: 300 CAPSULE ORAL at 22:33

## 2020-05-11 RX ADMIN — HUMAN INSULIN SCH UNITS: 100 INJECTION, SOLUTION SUBCUTANEOUS at 13:08

## 2020-05-11 RX ADMIN — ALBUTEROL SULFATE SCH CAP: 90 AEROSOL, METERED RESPIRATORY (INHALATION) at 10:00

## 2020-05-12 VITALS — DIASTOLIC BLOOD PRESSURE: 53 MMHG | SYSTOLIC BLOOD PRESSURE: 96 MMHG

## 2020-05-12 VITALS — SYSTOLIC BLOOD PRESSURE: 102 MMHG | DIASTOLIC BLOOD PRESSURE: 50 MMHG

## 2020-05-12 VITALS — SYSTOLIC BLOOD PRESSURE: 116 MMHG | DIASTOLIC BLOOD PRESSURE: 54 MMHG

## 2020-05-12 VITALS — DIASTOLIC BLOOD PRESSURE: 41 MMHG | SYSTOLIC BLOOD PRESSURE: 96 MMHG

## 2020-05-12 VITALS — DIASTOLIC BLOOD PRESSURE: 54 MMHG | SYSTOLIC BLOOD PRESSURE: 101 MMHG

## 2020-05-12 VITALS — SYSTOLIC BLOOD PRESSURE: 96 MMHG | DIASTOLIC BLOOD PRESSURE: 41 MMHG

## 2020-05-12 RX ADMIN — HUMAN INSULIN SCH UNITS: 100 INJECTION, SOLUTION SUBCUTANEOUS at 06:37

## 2020-05-12 RX ADMIN — HUMAN INSULIN SCH UNITS: 100 INJECTION, SOLUTION SUBCUTANEOUS at 22:32

## 2020-05-12 RX ADMIN — IPRATROPIUM BROMIDE PRN MG: 0.5 SOLUTION RESPIRATORY (INHALATION) at 10:14

## 2020-05-12 RX ADMIN — DOXAZOSIN SCH MG: 2 TABLET ORAL at 22:15

## 2020-05-12 RX ADMIN — ALBUTEROL SULFATE PRN MG: 2.5 SOLUTION RESPIRATORY (INHALATION) at 14:05

## 2020-05-12 RX ADMIN — ALBUTEROL SULFATE SCH CAP: 90 AEROSOL, METERED RESPIRATORY (INHALATION) at 09:49

## 2020-05-12 RX ADMIN — Medication SCH STRIP: at 16:26

## 2020-05-12 RX ADMIN — HUMAN INSULIN SCH UNITS: 100 INJECTION, SOLUTION SUBCUTANEOUS at 17:22

## 2020-05-12 RX ADMIN — ALBUTEROL SULFATE PRN MG: 2.5 SOLUTION RESPIRATORY (INHALATION) at 10:14

## 2020-05-12 RX ADMIN — HUMAN INSULIN SCH UNITS: 100 INJECTION, SOLUTION SUBCUTANEOUS at 12:36

## 2020-05-12 RX ADMIN — IPRATROPIUM BROMIDE PRN MG: 0.5 SOLUTION RESPIRATORY (INHALATION) at 14:05

## 2020-05-12 RX ADMIN — ALBUTEROL SULFATE PRN MG: 2.5 SOLUTION RESPIRATORY (INHALATION) at 06:33

## 2020-05-12 RX ADMIN — GEMFIBROZIL SCH MG: 600 TABLET, FILM COATED ORAL at 09:45

## 2020-05-12 RX ADMIN — GABAPENTIN SCH MG: 300 CAPSULE ORAL at 09:45

## 2020-05-12 RX ADMIN — ASPIRIN SCH MG: 81 TABLET ORAL at 09:47

## 2020-05-12 RX ADMIN — GEMFIBROZIL SCH MG: 600 TABLET, FILM COATED ORAL at 22:16

## 2020-05-12 RX ADMIN — ALBUTEROL SULFATE SCH CAP: 90 AEROSOL, METERED RESPIRATORY (INHALATION) at 22:00

## 2020-05-12 RX ADMIN — Medication SCH STRIP: at 12:27

## 2020-05-12 RX ADMIN — Medication SCH STRIP: at 22:17

## 2020-05-12 RX ADMIN — TEMAZEPAM PRN MG: 15 CAPSULE ORAL at 22:16

## 2020-05-12 RX ADMIN — GABAPENTIN SCH MG: 300 CAPSULE ORAL at 22:16

## 2020-05-12 RX ADMIN — IPRATROPIUM BROMIDE PRN MG: 0.5 SOLUTION RESPIRATORY (INHALATION) at 06:33

## 2020-05-12 RX ADMIN — AZITHROMYCIN MONOHYDRATE SCH MG: 250 TABLET ORAL at 09:46

## 2020-05-12 RX ADMIN — FUROSEMIDE SCH MG: 40 TABLET ORAL at 09:47

## 2020-05-12 RX ADMIN — TEMAZEPAM PRN MG: 15 CAPSULE ORAL at 00:02

## 2020-05-12 RX ADMIN — CEFTRIAXONE SODIUM SCH MLS/HR: 1 INJECTION, POWDER, FOR SOLUTION INTRAMUSCULAR; INTRAVENOUS at 09:45

## 2020-05-12 RX ADMIN — ENOXAPARIN SODIUM SCH MG: 30 INJECTION SUBCUTANEOUS at 09:47

## 2020-05-12 RX ADMIN — ATORVASTATIN CALCIUM SCH MG: 20 TABLET, FILM COATED ORAL at 22:15

## 2020-05-12 RX ADMIN — Medication SCH STRIP: at 06:37

## 2020-05-13 VITALS — DIASTOLIC BLOOD PRESSURE: 59 MMHG | SYSTOLIC BLOOD PRESSURE: 121 MMHG

## 2020-05-13 VITALS — SYSTOLIC BLOOD PRESSURE: 116 MMHG | DIASTOLIC BLOOD PRESSURE: 54 MMHG

## 2020-05-13 VITALS — SYSTOLIC BLOOD PRESSURE: 110 MMHG | DIASTOLIC BLOOD PRESSURE: 54 MMHG

## 2020-05-13 VITALS — SYSTOLIC BLOOD PRESSURE: 127 MMHG | DIASTOLIC BLOOD PRESSURE: 57 MMHG

## 2020-05-13 VITALS — DIASTOLIC BLOOD PRESSURE: 41 MMHG | SYSTOLIC BLOOD PRESSURE: 102 MMHG

## 2020-05-13 PROCEDURE — 5A1D70Z PERFORMANCE OF URINARY FILTRATION, INTERMITTENT, LESS THAN 6 HOURS PER DAY: ICD-10-PCS | Performed by: INTERNAL MEDICINE

## 2020-05-13 RX ADMIN — DOXAZOSIN SCH MG: 2 TABLET ORAL at 22:00

## 2020-05-13 RX ADMIN — ENOXAPARIN SODIUM SCH MG: 30 INJECTION SUBCUTANEOUS at 09:55

## 2020-05-13 RX ADMIN — Medication SCH STRIP: at 12:39

## 2020-05-13 RX ADMIN — Medication SCH STRIP: at 06:55

## 2020-05-13 RX ADMIN — Medication SCH STRIP: at 22:00

## 2020-05-13 RX ADMIN — HUMAN INSULIN SCH UNITS: 100 INJECTION, SOLUTION SUBCUTANEOUS at 22:01

## 2020-05-13 RX ADMIN — ATORVASTATIN CALCIUM SCH MG: 20 TABLET, FILM COATED ORAL at 22:00

## 2020-05-13 RX ADMIN — IPRATROPIUM BROMIDE PRN MG: 0.5 SOLUTION RESPIRATORY (INHALATION) at 06:43

## 2020-05-13 RX ADMIN — GABAPENTIN SCH MG: 300 CAPSULE ORAL at 22:00

## 2020-05-13 RX ADMIN — ALBUTEROL SULFATE SCH CAP: 90 AEROSOL, METERED RESPIRATORY (INHALATION) at 10:00

## 2020-05-13 RX ADMIN — HUMAN INSULIN SCH UNITS: 100 INJECTION, SOLUTION SUBCUTANEOUS at 06:55

## 2020-05-13 RX ADMIN — GEMFIBROZIL SCH MG: 600 TABLET, FILM COATED ORAL at 22:00

## 2020-05-13 RX ADMIN — AZITHROMYCIN MONOHYDRATE SCH MG: 250 TABLET ORAL at 09:55

## 2020-05-13 RX ADMIN — ASPIRIN SCH MG: 81 TABLET ORAL at 09:53

## 2020-05-13 RX ADMIN — GEMFIBROZIL SCH MG: 600 TABLET, FILM COATED ORAL at 09:54

## 2020-05-13 RX ADMIN — GABAPENTIN SCH MG: 300 CAPSULE ORAL at 09:54

## 2020-05-13 RX ADMIN — ALBUTEROL SULFATE PRN MG: 2.5 SOLUTION RESPIRATORY (INHALATION) at 06:43

## 2020-05-13 RX ADMIN — FUROSEMIDE SCH MG: 40 TABLET ORAL at 09:53

## 2020-05-13 RX ADMIN — Medication SCH STRIP: at 17:27

## 2020-05-13 RX ADMIN — HUMAN INSULIN SCH UNITS: 100 INJECTION, SOLUTION SUBCUTANEOUS at 12:40

## 2020-05-13 RX ADMIN — HEPARIN SODIUM ONE UNITS: 1000 INJECTION, SOLUTION INTRAVENOUS; SUBCUTANEOUS at 13:30

## 2020-05-13 RX ADMIN — CEFTRIAXONE SODIUM SCH MLS/HR: 1 INJECTION, POWDER, FOR SOLUTION INTRAMUSCULAR; INTRAVENOUS at 09:52

## 2020-05-13 RX ADMIN — HUMAN INSULIN SCH UNITS: 100 INJECTION, SOLUTION SUBCUTANEOUS at 17:28

## 2020-05-13 RX ADMIN — ALBUTEROL SULFATE SCH CAP: 90 AEROSOL, METERED RESPIRATORY (INHALATION) at 21:59

## 2020-05-14 VITALS — DIASTOLIC BLOOD PRESSURE: 46 MMHG | SYSTOLIC BLOOD PRESSURE: 105 MMHG

## 2020-05-14 VITALS — DIASTOLIC BLOOD PRESSURE: 51 MMHG | SYSTOLIC BLOOD PRESSURE: 109 MMHG

## 2020-05-14 VITALS — SYSTOLIC BLOOD PRESSURE: 100 MMHG | DIASTOLIC BLOOD PRESSURE: 48 MMHG

## 2020-05-14 VITALS — SYSTOLIC BLOOD PRESSURE: 115 MMHG | DIASTOLIC BLOOD PRESSURE: 53 MMHG

## 2020-05-14 LAB
ANION GAP SERPL CALCULATED.3IONS-SCNC: 3 MMOL/L (ref 5–15)
BUN SERPL-MCNC: 37 MG/DL (ref 7–18)
BUN/CREAT SERPL: 12.9
CALCIUM SERPL-MCNC: 7.6 MG/DL (ref 8.5–10.1)
CHLORIDE SERPL-SCNC: 98 MMOL/L (ref 98–107)
CO2 SERPL-SCNC: 32 MMOL/L (ref 21–32)
GLUCOSE SERPL-MCNC: 94 MG/DL (ref 74–106)
HCT VFR BLD AUTO: 28.3 % (ref 41–53)
HGB BLD-MCNC: 9.4 G/DL (ref 13.5–17.5)
MCH RBC QN AUTO: 27.8 PG (ref 28–32)
MCV RBC AUTO: 84 FL (ref 80–100)
NRBC BLD QL AUTO: 0 %
POTASSIUM SERPL-SCNC: 3.6 MMOL/L (ref 3.5–5.1)
SODIUM SERPL-SCNC: 133 MMOL/L (ref 136–145)

## 2020-05-14 RX ADMIN — GABAPENTIN SCH MG: 300 CAPSULE ORAL at 22:24

## 2020-05-14 RX ADMIN — AZITHROMYCIN MONOHYDRATE SCH MG: 250 TABLET ORAL at 14:36

## 2020-05-14 RX ADMIN — ENOXAPARIN SODIUM SCH MG: 30 INJECTION SUBCUTANEOUS at 14:35

## 2020-05-14 RX ADMIN — ATORVASTATIN CALCIUM SCH MG: 20 TABLET, FILM COATED ORAL at 22:23

## 2020-05-14 RX ADMIN — DOXAZOSIN SCH MG: 2 TABLET ORAL at 22:23

## 2020-05-14 RX ADMIN — HUMAN INSULIN SCH UNITS: 100 INJECTION, SOLUTION SUBCUTANEOUS at 06:52

## 2020-05-14 RX ADMIN — ASPIRIN SCH MG: 81 TABLET ORAL at 14:35

## 2020-05-14 RX ADMIN — ALBUTEROL SULFATE SCH CAP: 90 AEROSOL, METERED RESPIRATORY (INHALATION) at 10:00

## 2020-05-14 RX ADMIN — Medication SCH STRIP: at 11:30

## 2020-05-14 RX ADMIN — TEMAZEPAM PRN MG: 15 CAPSULE ORAL at 22:27

## 2020-05-14 RX ADMIN — Medication SCH STRIP: at 22:24

## 2020-05-14 RX ADMIN — HUMAN INSULIN SCH UNITS: 100 INJECTION, SOLUTION SUBCUTANEOUS at 12:45

## 2020-05-14 RX ADMIN — HUMAN INSULIN SCH UNITS: 100 INJECTION, SOLUTION SUBCUTANEOUS at 22:26

## 2020-05-14 RX ADMIN — ALBUTEROL SULFATE SCH CAP: 90 AEROSOL, METERED RESPIRATORY (INHALATION) at 22:00

## 2020-05-14 RX ADMIN — FUROSEMIDE SCH MG: 40 TABLET ORAL at 14:36

## 2020-05-14 RX ADMIN — GABAPENTIN SCH MG: 300 CAPSULE ORAL at 10:00

## 2020-05-14 RX ADMIN — Medication SCH STRIP: at 16:40

## 2020-05-14 RX ADMIN — Medication SCH STRIP: at 06:52

## 2020-05-14 RX ADMIN — HUMAN INSULIN SCH UNITS: 100 INJECTION, SOLUTION SUBCUTANEOUS at 17:16

## 2020-05-15 VITALS — SYSTOLIC BLOOD PRESSURE: 106 MMHG | DIASTOLIC BLOOD PRESSURE: 52 MMHG

## 2020-05-15 VITALS — DIASTOLIC BLOOD PRESSURE: 52 MMHG | SYSTOLIC BLOOD PRESSURE: 135 MMHG

## 2020-05-15 VITALS — SYSTOLIC BLOOD PRESSURE: 118 MMHG | DIASTOLIC BLOOD PRESSURE: 62 MMHG

## 2020-05-15 VITALS — DIASTOLIC BLOOD PRESSURE: 64 MMHG | SYSTOLIC BLOOD PRESSURE: 101 MMHG

## 2020-05-15 PROCEDURE — 5A1D70Z PERFORMANCE OF URINARY FILTRATION, INTERMITTENT, LESS THAN 6 HOURS PER DAY: ICD-10-PCS | Performed by: INTERNAL MEDICINE

## 2020-05-15 RX ADMIN — Medication SCH STRIP: at 12:29

## 2020-05-15 RX ADMIN — ALBUTEROL SULFATE SCH CAP: 90 AEROSOL, METERED RESPIRATORY (INHALATION) at 10:00

## 2020-05-15 RX ADMIN — AZITHROMYCIN MONOHYDRATE SCH MG: 250 TABLET ORAL at 10:33

## 2020-05-15 RX ADMIN — GABAPENTIN SCH MG: 300 CAPSULE ORAL at 10:33

## 2020-05-15 RX ADMIN — ENOXAPARIN SODIUM SCH MG: 30 INJECTION SUBCUTANEOUS at 10:34

## 2020-05-15 RX ADMIN — ASPIRIN SCH MG: 81 TABLET ORAL at 10:32

## 2020-05-15 RX ADMIN — HUMAN INSULIN SCH UNITS: 100 INJECTION, SOLUTION SUBCUTANEOUS at 12:28

## 2020-05-15 RX ADMIN — HUMAN INSULIN SCH UNITS: 100 INJECTION, SOLUTION SUBCUTANEOUS at 06:41

## 2020-05-15 RX ADMIN — FUROSEMIDE SCH MG: 40 TABLET ORAL at 10:34

## 2020-05-15 RX ADMIN — Medication SCH STRIP: at 06:41

## 2020-06-23 ENCOUNTER — HOSPITAL ENCOUNTER (INPATIENT)
Dept: HOSPITAL 15 - ER | Age: 85
LOS: 10 days | Discharge: HOME | DRG: 182 | End: 2020-07-03
Attending: INTERNAL MEDICINE | Admitting: INTERNAL MEDICINE
Payer: MEDICAID

## 2020-06-23 VITALS — BODY MASS INDEX: 24.95 KG/M2 | WEIGHT: 168.43 LBS | HEIGHT: 69 IN

## 2020-06-23 VITALS — DIASTOLIC BLOOD PRESSURE: 65 MMHG | SYSTOLIC BLOOD PRESSURE: 128 MMHG

## 2020-06-23 VITALS — SYSTOLIC BLOOD PRESSURE: 128 MMHG | DIASTOLIC BLOOD PRESSURE: 65 MMHG

## 2020-06-23 DIAGNOSIS — Z82.49: ICD-10-CM

## 2020-06-23 DIAGNOSIS — I13.2: Primary | ICD-10-CM

## 2020-06-23 DIAGNOSIS — B96.89: ICD-10-CM

## 2020-06-23 DIAGNOSIS — J18.9: ICD-10-CM

## 2020-06-23 DIAGNOSIS — E11.22: ICD-10-CM

## 2020-06-23 DIAGNOSIS — Z79.4: ICD-10-CM

## 2020-06-23 DIAGNOSIS — D63.1: ICD-10-CM

## 2020-06-23 DIAGNOSIS — Z20.828: ICD-10-CM

## 2020-06-23 DIAGNOSIS — E78.5: ICD-10-CM

## 2020-06-23 DIAGNOSIS — N40.0: ICD-10-CM

## 2020-06-23 DIAGNOSIS — J44.0: ICD-10-CM

## 2020-06-23 DIAGNOSIS — I50.33: ICD-10-CM

## 2020-06-23 DIAGNOSIS — I82.C11: ICD-10-CM

## 2020-06-23 DIAGNOSIS — J96.20: ICD-10-CM

## 2020-06-23 DIAGNOSIS — T82.590A: ICD-10-CM

## 2020-06-23 DIAGNOSIS — R31.0: ICD-10-CM

## 2020-06-23 DIAGNOSIS — Z95.0: ICD-10-CM

## 2020-06-23 DIAGNOSIS — N39.0: ICD-10-CM

## 2020-06-23 DIAGNOSIS — N18.6: ICD-10-CM

## 2020-06-23 DIAGNOSIS — I25.10: ICD-10-CM

## 2020-06-23 DIAGNOSIS — Z79.01: ICD-10-CM

## 2020-06-23 LAB
ALBUMIN SERPL-MCNC: 3.5 G/DL (ref 3.4–5)
ALP SERPL-CCNC: 62 U/L (ref 45–117)
ALT SERPL-CCNC: 17 U/L (ref 16–61)
ANION GAP SERPL CALCULATED.3IONS-SCNC: 8 MMOL/L (ref 5–15)
BILIRUB SERPL-MCNC: 0.5 MG/DL (ref 0.2–1)
BUN SERPL-MCNC: 58 MG/DL (ref 7–18)
BUN/CREAT SERPL: 14.6
CALCIUM SERPL-MCNC: 8.8 MG/DL (ref 8.5–10.1)
CHLORIDE SERPL-SCNC: 99 MMOL/L (ref 98–107)
CO2 SERPL-SCNC: 28 MMOL/L (ref 21–32)
GLUCOSE SERPL-MCNC: 129 MG/DL (ref 74–106)
HCT VFR BLD AUTO: 35.3 % (ref 41–53)
HGB BLD-MCNC: 11.2 G/DL (ref 13.5–17.5)
LACTATE PLASV-SCNC: 2.6 MMOL/L (ref 0.4–2)
MAGNESIUM SERPL-MCNC: 2.4 MG/DL (ref 1.6–2.6)
MCH RBC QN AUTO: 27.7 PG (ref 28–32)
MCV RBC AUTO: 87.1 FL (ref 80–100)
NRBC BLD QL AUTO: 0.1 %
POTASSIUM SERPL-SCNC: 5.3 MMOL/L (ref 3.5–5.1)
PROT SERPL-MCNC: 7.6 G/DL (ref 6.4–8.2)
SODIUM SERPL-SCNC: 135 MMOL/L (ref 136–145)

## 2020-06-23 PROCEDURE — 85025 COMPLETE CBC W/AUTO DIFF WBC: CPT

## 2020-06-23 PROCEDURE — 85610 PROTHROMBIN TIME: CPT

## 2020-06-23 PROCEDURE — 71045 X-RAY EXAM CHEST 1 VIEW: CPT

## 2020-06-23 PROCEDURE — 84484 ASSAY OF TROPONIN QUANT: CPT

## 2020-06-23 PROCEDURE — 93005 ELECTROCARDIOGRAM TRACING: CPT

## 2020-06-23 PROCEDURE — 87804 INFLUENZA ASSAY W/OPTIC: CPT

## 2020-06-23 PROCEDURE — 83735 ASSAY OF MAGNESIUM: CPT

## 2020-06-23 PROCEDURE — 99152 MOD SED SAME PHYS/QHP 5/>YRS: CPT

## 2020-06-23 PROCEDURE — 99153 MOD SED SAME PHYS/QHP EA: CPT

## 2020-06-23 PROCEDURE — 80048 BASIC METABOLIC PNL TOTAL CA: CPT

## 2020-06-23 PROCEDURE — 36561 INSERT TUNNELED CV CATH: CPT

## 2020-06-23 PROCEDURE — 36415 COLL VENOUS BLD VENIPUNCTURE: CPT

## 2020-06-23 PROCEDURE — 80053 COMPREHEN METABOLIC PANEL: CPT

## 2020-06-23 PROCEDURE — 86901 BLOOD TYPING SEROLOGIC RH(D): CPT

## 2020-06-23 PROCEDURE — 93971 EXTREMITY STUDY: CPT

## 2020-06-23 PROCEDURE — 85379 FIBRIN DEGRADATION QUANT: CPT

## 2020-06-23 PROCEDURE — 76937 US GUIDE VASCULAR ACCESS: CPT

## 2020-06-23 PROCEDURE — 87186 SC STD MICRODIL/AGAR DIL: CPT

## 2020-06-23 PROCEDURE — 87088 URINE BACTERIA CULTURE: CPT

## 2020-06-23 PROCEDURE — 87077 CULTURE AEROBIC IDENTIFY: CPT

## 2020-06-23 PROCEDURE — 87040 BLOOD CULTURE FOR BACTERIA: CPT

## 2020-06-23 PROCEDURE — 94640 AIRWAY INHALATION TREATMENT: CPT

## 2020-06-23 PROCEDURE — 86141 C-REACTIVE PROTEIN HS: CPT

## 2020-06-23 PROCEDURE — 80202 ASSAY OF VANCOMYCIN: CPT

## 2020-06-23 PROCEDURE — 83615 LACTATE (LD) (LDH) ENZYME: CPT

## 2020-06-23 PROCEDURE — 82962 GLUCOSE BLOOD TEST: CPT

## 2020-06-23 PROCEDURE — 85730 THROMBOPLASTIN TIME PARTIAL: CPT

## 2020-06-23 PROCEDURE — 87880 STREP A ASSAY W/OPTIC: CPT

## 2020-06-23 PROCEDURE — 86900 BLOOD TYPING SEROLOGIC ABO: CPT

## 2020-06-23 PROCEDURE — 77001 FLUOROGUIDE FOR VEIN DEVICE: CPT

## 2020-06-23 PROCEDURE — 83880 ASSAY OF NATRIURETIC PEPTIDE: CPT

## 2020-06-23 PROCEDURE — 87086 URINE CULTURE/COLONY COUNT: CPT

## 2020-06-23 PROCEDURE — 83605 ASSAY OF LACTIC ACID: CPT

## 2020-06-23 PROCEDURE — 87070 CULTURE OTHR SPECIMN AEROBIC: CPT

## 2020-06-23 PROCEDURE — 86850 RBC ANTIBODY SCREEN: CPT

## 2020-06-23 PROCEDURE — 90935 HEMODIALYSIS ONE EVALUATION: CPT

## 2020-06-23 PROCEDURE — 81001 URINALYSIS AUTO W/SCOPE: CPT

## 2020-06-23 PROCEDURE — 87081 CULTURE SCREEN ONLY: CPT

## 2020-06-23 PROCEDURE — 82728 ASSAY OF FERRITIN: CPT

## 2020-06-23 RX ADMIN — ATORVASTATIN CALCIUM SCH MG: 20 TABLET, FILM COATED ORAL at 21:32

## 2020-06-23 RX ADMIN — IPRATROPIUM BROMIDE SCH MG: 0.5 SOLUTION RESPIRATORY (INHALATION) at 19:32

## 2020-06-23 RX ADMIN — GABAPENTIN SCH MG: 300 CAPSULE ORAL at 21:32

## 2020-06-23 RX ADMIN — Medication SCH STRIP: at 18:14

## 2020-06-23 RX ADMIN — ALBUTEROL SULFATE SCH MG: 2.5 SOLUTION RESPIRATORY (INHALATION) at 19:32

## 2020-06-23 RX ADMIN — HUMAN INSULIN SCH UNITS: 100 INJECTION, SOLUTION SUBCUTANEOUS at 17:59

## 2020-06-23 RX ADMIN — DOXAZOSIN SCH MG: 2 TABLET ORAL at 21:32

## 2020-06-23 NOTE — NUR
Telemetry admit from ER

JOCELYNE NAVARRETE admitted to Telemetry unit. Patient oriented to SAMANTHA BAIG, 
primary RN, unit, room, bed, and unit policies regarding patient care and visiting hours. 
Patient now on continuous telemetry monitoring, tele box #39 and telemetry reading on 
arrival to unit is SR 64. Patient placed on bedside oxygen 2L NC, weighed by bedscale and 
encouraged to call if they need something. Walker at bedside, bed in lowest locked position, 
side rails up x2, call light within reach. All questions and concerns addressed, patient 
verbalized understanding.

## 2020-06-24 VITALS — SYSTOLIC BLOOD PRESSURE: 128 MMHG | DIASTOLIC BLOOD PRESSURE: 65 MMHG

## 2020-06-24 VITALS — DIASTOLIC BLOOD PRESSURE: 64 MMHG | SYSTOLIC BLOOD PRESSURE: 122 MMHG

## 2020-06-24 VITALS — SYSTOLIC BLOOD PRESSURE: 113 MMHG | DIASTOLIC BLOOD PRESSURE: 59 MMHG

## 2020-06-24 VITALS — SYSTOLIC BLOOD PRESSURE: 121 MMHG | DIASTOLIC BLOOD PRESSURE: 52 MMHG

## 2020-06-24 VITALS — SYSTOLIC BLOOD PRESSURE: 125 MMHG | DIASTOLIC BLOOD PRESSURE: 62 MMHG

## 2020-06-24 VITALS — DIASTOLIC BLOOD PRESSURE: 61 MMHG | SYSTOLIC BLOOD PRESSURE: 132 MMHG

## 2020-06-24 LAB
ANION GAP SERPL CALCULATED.3IONS-SCNC: 7 MMOL/L (ref 5–15)
BUN SERPL-MCNC: 74 MG/DL (ref 7–18)
BUN/CREAT SERPL: 16.3
CALCIUM SERPL-MCNC: 8.7 MG/DL (ref 8.5–10.1)
CHLORIDE SERPL-SCNC: 99 MMOL/L (ref 98–107)
CO2 SERPL-SCNC: 27 MMOL/L (ref 21–32)
GLUCOSE SERPL-MCNC: 187 MG/DL (ref 74–106)
HCT VFR BLD AUTO: 33.2 % (ref 41–53)
HGB BLD-MCNC: 10.7 G/DL (ref 13.5–17.5)
MCH RBC QN AUTO: 27.9 PG (ref 28–32)
MCV RBC AUTO: 86.7 FL (ref 80–100)
NRBC BLD QL AUTO: 0 %
POTASSIUM SERPL-SCNC: 5 MMOL/L (ref 3.5–5.1)
SODIUM SERPL-SCNC: 133 MMOL/L (ref 136–145)

## 2020-06-24 PROCEDURE — 5A1D70Z PERFORMANCE OF URINARY FILTRATION, INTERMITTENT, LESS THAN 6 HOURS PER DAY: ICD-10-PCS

## 2020-06-24 RX ADMIN — Medication SCH STRIP: at 17:15

## 2020-06-24 RX ADMIN — IPRATROPIUM BROMIDE SCH MG: 0.5 SOLUTION RESPIRATORY (INHALATION) at 06:39

## 2020-06-24 RX ADMIN — HUMAN INSULIN SCH UNITS: 100 INJECTION, SOLUTION SUBCUTANEOUS at 00:34

## 2020-06-24 RX ADMIN — HUMAN INSULIN SCH UNITS: 100 INJECTION, SOLUTION SUBCUTANEOUS at 05:59

## 2020-06-24 RX ADMIN — HUMAN INSULIN SCH UNITS: 100 INJECTION, SOLUTION SUBCUTANEOUS at 13:06

## 2020-06-24 RX ADMIN — HUMAN INSULIN SCH UNITS: 100 INJECTION, SOLUTION SUBCUTANEOUS at 17:16

## 2020-06-24 RX ADMIN — IPRATROPIUM BROMIDE SCH MG: 0.5 SOLUTION RESPIRATORY (INHALATION) at 11:23

## 2020-06-24 RX ADMIN — ALBUTEROL SULFATE SCH MG: 2.5 SOLUTION RESPIRATORY (INHALATION) at 11:23

## 2020-06-24 RX ADMIN — Medication SCH STRIP: at 13:06

## 2020-06-24 RX ADMIN — Medication SCH STRIP: at 05:55

## 2020-06-24 RX ADMIN — Medication SCH STRIP: at 00:33

## 2020-06-24 RX ADMIN — DOXAZOSIN SCH MG: 2 TABLET ORAL at 21:51

## 2020-06-24 RX ADMIN — IPRATROPIUM BROMIDE SCH MG: 0.5 SOLUTION RESPIRATORY (INHALATION) at 19:10

## 2020-06-24 RX ADMIN — GABAPENTIN SCH MG: 300 CAPSULE ORAL at 21:49

## 2020-06-24 RX ADMIN — ATORVASTATIN CALCIUM SCH MG: 20 TABLET, FILM COATED ORAL at 21:50

## 2020-06-24 RX ADMIN — ALBUTEROL SULFATE SCH MG: 2.5 SOLUTION RESPIRATORY (INHALATION) at 06:39

## 2020-06-24 RX ADMIN — GABAPENTIN SCH MG: 300 CAPSULE ORAL at 09:02

## 2020-06-24 RX ADMIN — ALBUTEROL SULFATE SCH MG: 2.5 SOLUTION RESPIRATORY (INHALATION) at 19:11

## 2020-06-24 RX ADMIN — FUROSEMIDE SCH MG: 40 TABLET ORAL at 09:02

## 2020-06-24 NOTE — NUR
DIALYSIS

DIALYSIS RN INFORMED THIS RN THAT SHE IS UNSUCCESSFUL TO ACCESS PATIENT'S MISSY FISTULA. PER 
CAROL; SHE WOULD CONTACT HER COORDINATOR LAURO TO ASSESS PATIENT'S SIGHT AND CAROL MADE DR WATKINS AWARE. WILL CONTINUE TO MONITOR

## 2020-06-24 NOTE — NUR
Opening Shift Note

Assumed care of patient, awake and alert.  No S/S of distress/SOB or pain. Bed in 
lowest/locked position, bed rails upx2, call light within reach. Instructed on POC and to 
call for assist PRN. Will continue to monitor for changes Q1hr and PRN.

## 2020-06-24 NOTE — NUR
DIALYSIS

DIALYSIS RNs LAURO & CAROL AT BEDSIDE. LAURO ATTEMPTED ACCESSING PATIENT'S MISSY FISTULA. 1/4 
INCH SIZE CLOT WAS REMOVED WHILE LAURO, RN ATTEMPTED TO ACCESS FISTULA. PER LAURO; HE WILL 
NOTIFY MD WATKINS. PAGED DR ARMENTA. AWAITING RETURN CALL

## 2020-06-24 NOTE — NUR
MD CALL

DR ARMENTA RETURNED CALL RE: MD WATKINS'S RECOMMENDATIONS. NEW ORDERS RECEIVED/WILL CARRY OUT. 
WILL CONTINUE TO MONITOR

## 2020-06-24 NOTE — NUR
JUNE MEDINA DIALYSIS RN. DR WATKINS RECOMMENDS TO INFORM [facility] AND CONSULT RADIOLOGY FOR 
TUNNELED CATHETER PLACEMENT. AWAITING RETURN CALL FROM [facility]

## 2020-06-24 NOTE — NUR
DIALYSIS

DIALYSIS RNLAURO ASSESSED PATIENT'S MISSY FISTULA. PER LAURO; SIGHT IS ACCESSIBLE WITH SMALLER 
GAUGE NEEDLE AND WILL INFORM DIALYSIS RNCAROL. PER LAURO; PATIENT WILL BE ABLE TO BE 
DIALYZED TODAY. WILL CONTINUE TO MONITOR

## 2020-06-24 NOTE — NUR
Opening Shift Note

Assumed care of patient, awake and alert x 4.  No S/S of distress/SOB. Bed is in lowest 
position and locked. Call light within reach. Board updated. Tele box number matches monitor 
and leads are intact.  Instructed on POC and to call for assist PRN, will continue to 
monitor for changes Q1hr and PRN.

## 2020-06-25 VITALS — SYSTOLIC BLOOD PRESSURE: 114 MMHG | DIASTOLIC BLOOD PRESSURE: 53 MMHG

## 2020-06-25 VITALS — SYSTOLIC BLOOD PRESSURE: 97 MMHG | DIASTOLIC BLOOD PRESSURE: 78 MMHG

## 2020-06-25 VITALS — DIASTOLIC BLOOD PRESSURE: 48 MMHG | SYSTOLIC BLOOD PRESSURE: 103 MMHG

## 2020-06-25 VITALS — SYSTOLIC BLOOD PRESSURE: 107 MMHG | DIASTOLIC BLOOD PRESSURE: 57 MMHG

## 2020-06-25 LAB
ANION GAP SERPL CALCULATED.3IONS-SCNC: 9 MMOL/L (ref 5–15)
APTT PPP: 26.8 SEC (ref 23.64–32.05)
BUN SERPL-MCNC: 92 MG/DL (ref 7–18)
BUN/CREAT SERPL: 19.5
CALCIUM SERPL-MCNC: 8.2 MG/DL (ref 8.5–10.1)
CHLORIDE SERPL-SCNC: 99 MMOL/L (ref 98–107)
CO2 SERPL-SCNC: 27 MMOL/L (ref 21–32)
GLUCOSE SERPL-MCNC: 138 MG/DL (ref 74–106)
HCT VFR BLD AUTO: 30.4 % (ref 41–53)
HGB BLD-MCNC: 9.9 G/DL (ref 13.5–17.5)
INR PPP: 1.13 (ref 0.9–1.15)
MCH RBC QN AUTO: 28.4 PG (ref 28–32)
MCV RBC AUTO: 87 FL (ref 80–100)
NRBC BLD QL AUTO: 0 %
POTASSIUM SERPL-SCNC: 4.7 MMOL/L (ref 3.5–5.1)
SODIUM SERPL-SCNC: 135 MMOL/L (ref 136–145)

## 2020-06-25 PROCEDURE — 057Y3ZZ DILATION OF UPPER VEIN, PERCUTANEOUS APPROACH: ICD-10-PCS | Performed by: RADIOLOGY

## 2020-06-25 PROCEDURE — 5A1D70Z PERFORMANCE OF URINARY FILTRATION, INTERMITTENT, LESS THAN 6 HOURS PER DAY: ICD-10-PCS

## 2020-06-25 RX ADMIN — HUMAN INSULIN SCH UNITS: 100 INJECTION, SOLUTION SUBCUTANEOUS at 18:00

## 2020-06-25 RX ADMIN — IPRATROPIUM BROMIDE SCH MG: 0.5 SOLUTION RESPIRATORY (INHALATION) at 12:10

## 2020-06-25 RX ADMIN — HUMAN INSULIN SCH UNITS: 100 INJECTION, SOLUTION SUBCUTANEOUS at 05:56

## 2020-06-25 RX ADMIN — HUMAN INSULIN SCH UNITS: 100 INJECTION, SOLUTION SUBCUTANEOUS at 12:00

## 2020-06-25 RX ADMIN — HUMAN INSULIN SCH UNITS: 100 INJECTION, SOLUTION SUBCUTANEOUS at 00:00

## 2020-06-25 RX ADMIN — DOXAZOSIN SCH MG: 2 TABLET ORAL at 22:13

## 2020-06-25 RX ADMIN — Medication SCH STRIP: at 00:32

## 2020-06-25 RX ADMIN — ALBUTEROL SULFATE SCH MG: 2.5 SOLUTION RESPIRATORY (INHALATION) at 19:00

## 2020-06-25 RX ADMIN — Medication SCH STRIP: at 05:56

## 2020-06-25 RX ADMIN — GABAPENTIN SCH MG: 300 CAPSULE ORAL at 10:07

## 2020-06-25 RX ADMIN — Medication SCH STRIP: at 18:46

## 2020-06-25 RX ADMIN — FUROSEMIDE SCH MG: 40 TABLET ORAL at 10:07

## 2020-06-25 RX ADMIN — ALBUTEROL SULFATE SCH MG: 2.5 SOLUTION RESPIRATORY (INHALATION) at 07:12

## 2020-06-25 RX ADMIN — ATORVASTATIN CALCIUM SCH MG: 20 TABLET, FILM COATED ORAL at 22:12

## 2020-06-25 RX ADMIN — Medication SCH STRIP: at 12:01

## 2020-06-25 RX ADMIN — ALBUTEROL SULFATE SCH MG: 2.5 SOLUTION RESPIRATORY (INHALATION) at 12:10

## 2020-06-25 RX ADMIN — GABAPENTIN SCH MG: 300 CAPSULE ORAL at 22:13

## 2020-06-25 RX ADMIN — IPRATROPIUM BROMIDE SCH MG: 0.5 SOLUTION RESPIRATORY (INHALATION) at 07:12

## 2020-06-25 RX ADMIN — IPRATROPIUM BROMIDE SCH MG: 0.5 SOLUTION RESPIRATORY (INHALATION) at 19:01

## 2020-06-25 NOTE — NUR
Spoke to BARBARA King and notified him of positive blood culture in previous note. Order 
received: Vancomycin per Pharmacy protocol.

## 2020-06-25 NOTE — NUR
Received a call from christopher Lim ([phone]; Password "[email or web address]"). Christopher made aware 
the Dialysis Nurse unable to access the fistula on the right upper arm. Waiting for 
Radiology Nurse to come over.

## 2020-06-25 NOTE — NUR
Received a call back from Radiology JOLEEN Johns that patient will have the tunnel cath 
insertion later today, patient can have the Aspirin as ordered.

## 2020-06-25 NOTE — NUR
Patient back to room post tunnel cath insertion as per Dr. Flowers. Dialysis Nurse Sergio at 
bedside. As per Dr. Flowers's Communication Order, resume previous diet.

## 2020-06-25 NOTE — NUR
Received a call from Radiology RN Nat that she will put the patient on schedule today for 
the tunnel cath insertion, let the patient have light breakfast. Dialysis Nurse Sergio is 
aware.

## 2020-06-25 NOTE — NUR
Dr. Polk at bedside. NICOLAS Carvajal translated in East Timorese. MD made aware Radiology RN Nat 
called back earlier that she will schedule the patient for tunnel cath insertion today, let 
the patient have light breakfast, Dialysis Nurse Sergio on standby if the cath is inserted 
today for the dialysis.

## 2020-06-25 NOTE — NUR
Transferred patient via bed to Cath Lab. Patient awake, oriented x4, speaks Macedonian only. IV 
line intact and patent. Patient refused to take off his pants. Explained to patient pants to 
be taken off before the procedure, Keena Cr translated in Macedonian. Patient still 
refused. Endorsed patient to Cath Lab RN.

## 2020-06-25 NOTE — NUR
Endorsed critical BUN lab of 92 to day shift RN. Nephrologist, MD Borges, and Dr. Polk are 
aware that patient is in ESRD and needs to be dialyzed. Prior BUN was 74 yesterday. 

-------------------------------------------------------------------------------

Addendum: 06/25/20 at 0803 by MARY CORDERO RN

-------------------------------------------------------------------------------

Patient is set to have tunneled dialysis cath placement today.

## 2020-06-25 NOTE — NUR
Opening Shift Note

Assumed care of patient, awake and alert x 4.  No S/S of distress/SOB or pain. Patient si 
currently receiving hemodialysis now. Dialysis nurse at bedside. Patient in no distress. Bed 
is in lowest position and locked. Call light within reach. Board updated. Instructed on POC 
and to call for assist PRN, will continue to monitor for changes Q1hr and PRN.

## 2020-06-25 NOTE — NUR
Paged hospitalist regarding positive blood culture (gram positive cocci in clusters). 
Patient has ESRD and his dialysis fistula is current malfunctioning. Patient is set to have 
hemodialysis catheter inserted in AM tomorrow. Patient is not currently receiving 
antibiotics. Vitals: BP: 125/62, HR: 74, O2 saturation: 97% on 2 l/min NC, Temp: 98.4.

## 2020-06-25 NOTE — NUR
RT NOTE: PT. REFUSING BREATHING TX. AT THIS TIME. PT. WANTING TO BRUSH HIS TEETH AND USE 
RESTROOM. NO S/S OF SOB NOTED. WILL CONTINUE TO MONITOR.

## 2020-06-25 NOTE — NUR
Hemodialysis completed. 2.1 liters removed. Vitals: BP: 101/53, HR: 69, RR: 18. Patient is 
alert and oriented x 4 and in no signs of distress at this time. Will continue to assess.

## 2020-06-25 NOTE — NUR
Respiratory note:

SCHEDULED MED NEB TX NOT GIVEN. PT WAS EATING A SANDWICH WHILE HAVING DIALYSIS. NO RESP 
DISTRESS NOTED.

## 2020-06-25 NOTE — NUR
Received report from Cath Lab JOLEEN Cherry that patient had angioplasty done on the right upper 
arm fistula, tunnel cath inserted, okay to use now for hemodialysis, patient to resume diet.

## 2020-06-26 VITALS — DIASTOLIC BLOOD PRESSURE: 51 MMHG | SYSTOLIC BLOOD PRESSURE: 108 MMHG

## 2020-06-26 VITALS — DIASTOLIC BLOOD PRESSURE: 55 MMHG | SYSTOLIC BLOOD PRESSURE: 110 MMHG

## 2020-06-26 VITALS — DIASTOLIC BLOOD PRESSURE: 59 MMHG | SYSTOLIC BLOOD PRESSURE: 116 MMHG

## 2020-06-26 VITALS — SYSTOLIC BLOOD PRESSURE: 117 MMHG | DIASTOLIC BLOOD PRESSURE: 61 MMHG

## 2020-06-26 VITALS — DIASTOLIC BLOOD PRESSURE: 61 MMHG | SYSTOLIC BLOOD PRESSURE: 117 MMHG

## 2020-06-26 VITALS — DIASTOLIC BLOOD PRESSURE: 58 MMHG | SYSTOLIC BLOOD PRESSURE: 107 MMHG

## 2020-06-26 LAB
ANION GAP SERPL CALCULATED.3IONS-SCNC: 5 MMOL/L (ref 5–15)
BUN SERPL-MCNC: 52 MG/DL (ref 7–18)
BUN/CREAT SERPL: 15.5
CALCIUM SERPL-MCNC: 7.9 MG/DL (ref 8.5–10.1)
CHLORIDE SERPL-SCNC: 100 MMOL/L (ref 98–107)
CO2 SERPL-SCNC: 34 MMOL/L (ref 21–32)
GLUCOSE SERPL-MCNC: 86 MG/DL (ref 74–106)
HCT VFR BLD AUTO: 33.2 % (ref 41–53)
HGB BLD-MCNC: 10.6 G/DL (ref 13.5–17.5)
MCH RBC QN AUTO: 28 PG (ref 28–32)
MCV RBC AUTO: 87.5 FL (ref 80–100)
NRBC BLD QL AUTO: 0 %
POTASSIUM SERPL-SCNC: 4.2 MMOL/L (ref 3.5–5.1)
SODIUM SERPL-SCNC: 139 MMOL/L (ref 136–145)

## 2020-06-26 PROCEDURE — B548ZZA ULTRASONOGRAPHY OF SUPERIOR VENA CAVA, GUIDANCE: ICD-10-PCS | Performed by: RADIOLOGY

## 2020-06-26 PROCEDURE — B51MYZZ FLUOROSCOPY OF RIGHT UPPER EXTREMITY VEINS USING OTHER CONTRAST: ICD-10-PCS | Performed by: RADIOLOGY

## 2020-06-26 PROCEDURE — 0JH63XZ INSERTION OF TUNNELED VASCULAR ACCESS DEVICE INTO CHEST SUBCUTANEOUS TISSUE AND FASCIA, PERCUTANEOUS APPROACH: ICD-10-PCS | Performed by: RADIOLOGY

## 2020-06-26 PROCEDURE — B5181ZA FLUOROSCOPY OF SUPERIOR VENA CAVA USING LOW OSMOLAR CONTRAST, GUIDANCE: ICD-10-PCS | Performed by: RADIOLOGY

## 2020-06-26 PROCEDURE — 02H633Z INSERTION OF INFUSION DEVICE INTO RIGHT ATRIUM, PERCUTANEOUS APPROACH: ICD-10-PCS | Performed by: RADIOLOGY

## 2020-06-26 RX ADMIN — Medication SCH STRIP: at 00:50

## 2020-06-26 RX ADMIN — FUROSEMIDE SCH MG: 40 TABLET ORAL at 10:27

## 2020-06-26 RX ADMIN — IPRATROPIUM BROMIDE SCH MG: 0.5 SOLUTION RESPIRATORY (INHALATION) at 11:28

## 2020-06-26 RX ADMIN — Medication SCH STRIP: at 17:14

## 2020-06-26 RX ADMIN — GABAPENTIN SCH MG: 300 CAPSULE ORAL at 10:26

## 2020-06-26 RX ADMIN — Medication SCH STRIP: at 11:44

## 2020-06-26 RX ADMIN — IPRATROPIUM BROMIDE SCH MG: 0.5 SOLUTION RESPIRATORY (INHALATION) at 18:40

## 2020-06-26 RX ADMIN — ALBUTEROL SULFATE SCH MG: 2.5 SOLUTION RESPIRATORY (INHALATION) at 18:40

## 2020-06-26 RX ADMIN — HUMAN INSULIN SCH UNITS: 100 INJECTION, SOLUTION SUBCUTANEOUS at 17:16

## 2020-06-26 RX ADMIN — ATORVASTATIN CALCIUM SCH MG: 20 TABLET, FILM COATED ORAL at 21:57

## 2020-06-26 RX ADMIN — ZOLPIDEM TARTRATE PRN MG: 5 TABLET ORAL at 23:21

## 2020-06-26 RX ADMIN — DOXAZOSIN SCH MG: 2 TABLET ORAL at 21:57

## 2020-06-26 RX ADMIN — HUMAN INSULIN SCH UNITS: 100 INJECTION, SOLUTION SUBCUTANEOUS at 06:42

## 2020-06-26 RX ADMIN — ALBUTEROL SULFATE SCH MG: 2.5 SOLUTION RESPIRATORY (INHALATION) at 11:28

## 2020-06-26 RX ADMIN — HUMAN INSULIN SCH UNITS: 100 INJECTION, SOLUTION SUBCUTANEOUS at 00:51

## 2020-06-26 RX ADMIN — HUMAN INSULIN SCH UNITS: 100 INJECTION, SOLUTION SUBCUTANEOUS at 11:45

## 2020-06-26 RX ADMIN — Medication SCH STRIP: at 23:28

## 2020-06-26 RX ADMIN — Medication SCH STRIP: at 06:30

## 2020-06-26 RX ADMIN — HUMAN INSULIN SCH UNITS: 100 INJECTION, SOLUTION SUBCUTANEOUS at 23:29

## 2020-06-26 RX ADMIN — ALBUTEROL SULFATE SCH MG: 2.5 SOLUTION RESPIRATORY (INHALATION) at 06:16

## 2020-06-26 RX ADMIN — IPRATROPIUM BROMIDE SCH MG: 0.5 SOLUTION RESPIRATORY (INHALATION) at 06:17

## 2020-06-26 RX ADMIN — GABAPENTIN SCH MG: 300 CAPSULE ORAL at 21:58

## 2020-06-26 NOTE — NUR
Nutrition Assessment Notes



please see attached link for complete assessment



Est Energy needs BW 78 k-2106kcals (25-27 kcal/kgBW), Est Protein needs:  
gms/day (1.2-1.3 gm/kgBW r/t HD). Will continue to monitor and reassess prn.




-------------------------------------------------------------------------------

Addendum: 20 at 1444 by Jessika Sanchez RD

-------------------------------------------------------------------------------

Amended: Links added.

## 2020-06-27 VITALS — SYSTOLIC BLOOD PRESSURE: 89 MMHG | DIASTOLIC BLOOD PRESSURE: 48 MMHG

## 2020-06-27 VITALS — DIASTOLIC BLOOD PRESSURE: 61 MMHG | SYSTOLIC BLOOD PRESSURE: 117 MMHG

## 2020-06-27 VITALS — DIASTOLIC BLOOD PRESSURE: 60 MMHG | SYSTOLIC BLOOD PRESSURE: 125 MMHG

## 2020-06-27 VITALS — SYSTOLIC BLOOD PRESSURE: 112 MMHG | DIASTOLIC BLOOD PRESSURE: 65 MMHG

## 2020-06-27 VITALS — SYSTOLIC BLOOD PRESSURE: 114 MMHG | DIASTOLIC BLOOD PRESSURE: 55 MMHG

## 2020-06-27 VITALS — SYSTOLIC BLOOD PRESSURE: 137 MMHG | DIASTOLIC BLOOD PRESSURE: 61 MMHG

## 2020-06-27 LAB
ANION GAP SERPL CALCULATED.3IONS-SCNC: 8 MMOL/L (ref 5–15)
BUN SERPL-MCNC: 73 MG/DL (ref 7–18)
BUN/CREAT SERPL: 17.2
CALCIUM SERPL-MCNC: 7.7 MG/DL (ref 8.5–10.1)
CHLORIDE SERPL-SCNC: 99 MMOL/L (ref 98–107)
CO2 SERPL-SCNC: 28 MMOL/L (ref 21–32)
GLUCOSE SERPL-MCNC: 90 MG/DL (ref 74–106)
HCT VFR BLD AUTO: 31.2 % (ref 41–53)
HGB BLD-MCNC: 10.2 G/DL (ref 13.5–17.5)
MCH RBC QN AUTO: 28.4 PG (ref 28–32)
MCV RBC AUTO: 86.9 FL (ref 80–100)
NRBC BLD QL AUTO: 0 %
POTASSIUM SERPL-SCNC: 4 MMOL/L (ref 3.5–5.1)
SODIUM SERPL-SCNC: 135 MMOL/L (ref 136–145)

## 2020-06-27 PROCEDURE — 5A1D70Z PERFORMANCE OF URINARY FILTRATION, INTERMITTENT, LESS THAN 6 HOURS PER DAY: ICD-10-PCS

## 2020-06-27 RX ADMIN — HUMAN INSULIN SCH UNITS: 100 INJECTION, SOLUTION SUBCUTANEOUS at 23:57

## 2020-06-27 RX ADMIN — IPRATROPIUM BROMIDE SCH MG: 0.5 SOLUTION RESPIRATORY (INHALATION) at 13:05

## 2020-06-27 RX ADMIN — ALBUTEROL SULFATE SCH MG: 2.5 SOLUTION RESPIRATORY (INHALATION) at 18:30

## 2020-06-27 RX ADMIN — GABAPENTIN SCH MG: 300 CAPSULE ORAL at 09:48

## 2020-06-27 RX ADMIN — FUROSEMIDE SCH MG: 40 TABLET ORAL at 09:49

## 2020-06-27 RX ADMIN — IPRATROPIUM BROMIDE SCH MG: 0.5 SOLUTION RESPIRATORY (INHALATION) at 06:00

## 2020-06-27 RX ADMIN — Medication SCH STRIP: at 05:56

## 2020-06-27 RX ADMIN — ALBUTEROL SULFATE SCH MG: 2.5 SOLUTION RESPIRATORY (INHALATION) at 13:05

## 2020-06-27 RX ADMIN — HUMAN INSULIN SCH UNITS: 100 INJECTION, SOLUTION SUBCUTANEOUS at 13:23

## 2020-06-27 RX ADMIN — ATORVASTATIN CALCIUM SCH MG: 20 TABLET, FILM COATED ORAL at 23:55

## 2020-06-27 RX ADMIN — HUMAN INSULIN SCH UNITS: 100 INJECTION, SOLUTION SUBCUTANEOUS at 18:16

## 2020-06-27 RX ADMIN — Medication SCH STRIP: at 19:19

## 2020-06-27 RX ADMIN — IPRATROPIUM BROMIDE SCH MG: 0.5 SOLUTION RESPIRATORY (INHALATION) at 18:30

## 2020-06-27 RX ADMIN — Medication SCH STRIP: at 13:24

## 2020-06-27 RX ADMIN — HUMAN INSULIN SCH UNITS: 100 INJECTION, SOLUTION SUBCUTANEOUS at 05:56

## 2020-06-27 RX ADMIN — DOXAZOSIN SCH MG: 2 TABLET ORAL at 22:00

## 2020-06-27 RX ADMIN — Medication SCH STRIP: at 23:57

## 2020-06-27 RX ADMIN — ALBUTEROL SULFATE SCH MG: 2.5 SOLUTION RESPIRATORY (INHALATION) at 06:00

## 2020-06-27 RX ADMIN — GABAPENTIN SCH MG: 300 CAPSULE ORAL at 23:55

## 2020-06-27 NOTE — NUR
Lamin from [facility] called, patient is scheduled for Hemodialysis this morning 
and she will be here within the hour.

## 2020-06-27 NOTE — NUR
Dialysis complete

Per dialysis RN 3L removed. Patient tolerated. Will continue to monitor Q1 hour and PRN.

## 2020-06-27 NOTE — NUR
Paged Dr. Blevins

Call from radiologist with ultra sound results, patient is positive for thrombosis in the 
right upper extremity. MD updated. Awaiting new orders. Will continue to monitor Q1 hour and 
PRN.

## 2020-06-27 NOTE — NUR
Opening Shift Note



Pt is sitting up in bedside chair. No s/s of any distress noted at this time. Pt denies any 
pain or discomfort at this time. Will continue to monitor q1hr.

## 2020-06-27 NOTE — NUR
RT NOTE: 

PT REFUSED TX AT THIS TIME. NO SIGNS OF RESPIRATORY DISTRESS. DIALYSIS RN IS BEDSIDE 
PREPARING FOR TX. PT AWARE THAT I WILL RETURN FOR NEXT TX. WILL CONTINUE TO MONITOR.

-------------------------------------------------------------------------------

Addendum: 06/27/20 at 0757 by LEN DUNCAN RT RT

-------------------------------------------------------------------------------

ON 2L NC SPO2 96 HR 67 RR 16

## 2020-06-27 NOTE — NUR
Opening Note

Received report from night shift RN. Patient is awake, alert and oriented. Patient is on 2L 
NC, respirations even and unlabored. Dialysis in process, RN at bedside. Bed in low and 
locked position, call light within reach. Will continue to monitor Q1 hour and PRN.

## 2020-06-27 NOTE — NUR
Dr. Blevins at bedside

Discussing plan of care with patient and this RN. New orders received. Will continue to 
monitor Q1 hour and PRN.

## 2020-06-28 VITALS — DIASTOLIC BLOOD PRESSURE: 53 MMHG | SYSTOLIC BLOOD PRESSURE: 122 MMHG

## 2020-06-28 VITALS — SYSTOLIC BLOOD PRESSURE: 95 MMHG | DIASTOLIC BLOOD PRESSURE: 59 MMHG

## 2020-06-28 VITALS — DIASTOLIC BLOOD PRESSURE: 56 MMHG | SYSTOLIC BLOOD PRESSURE: 111 MMHG

## 2020-06-28 VITALS — SYSTOLIC BLOOD PRESSURE: 116 MMHG | DIASTOLIC BLOOD PRESSURE: 51 MMHG

## 2020-06-28 VITALS — SYSTOLIC BLOOD PRESSURE: 91 MMHG | DIASTOLIC BLOOD PRESSURE: 49 MMHG

## 2020-06-28 RX ADMIN — HUMAN INSULIN SCH UNITS: 100 INJECTION, SOLUTION SUBCUTANEOUS at 22:55

## 2020-06-28 RX ADMIN — DOXAZOSIN SCH MG: 2 TABLET ORAL at 22:00

## 2020-06-28 RX ADMIN — IPRATROPIUM BROMIDE SCH MG: 0.5 SOLUTION RESPIRATORY (INHALATION) at 05:44

## 2020-06-28 RX ADMIN — ALBUTEROL SULFATE SCH MG: 2.5 SOLUTION RESPIRATORY (INHALATION) at 18:17

## 2020-06-28 RX ADMIN — IPRATROPIUM BROMIDE SCH MG: 0.5 SOLUTION RESPIRATORY (INHALATION) at 18:17

## 2020-06-28 RX ADMIN — Medication SCH STRIP: at 22:55

## 2020-06-28 RX ADMIN — Medication SCH STRIP: at 06:00

## 2020-06-28 RX ADMIN — IPRATROPIUM BROMIDE SCH MG: 0.5 SOLUTION RESPIRATORY (INHALATION) at 11:01

## 2020-06-28 RX ADMIN — Medication SCH STRIP: at 12:18

## 2020-06-28 RX ADMIN — HUMAN INSULIN SCH UNITS: 100 INJECTION, SOLUTION SUBCUTANEOUS at 06:00

## 2020-06-28 RX ADMIN — ALBUTEROL SULFATE SCH MG: 2.5 SOLUTION RESPIRATORY (INHALATION) at 11:01

## 2020-06-28 RX ADMIN — GABAPENTIN SCH MG: 300 CAPSULE ORAL at 22:54

## 2020-06-28 RX ADMIN — Medication SCH STRIP: at 17:45

## 2020-06-28 RX ADMIN — HUMAN INSULIN SCH UNITS: 100 INJECTION, SOLUTION SUBCUTANEOUS at 17:18

## 2020-06-28 RX ADMIN — ATORVASTATIN CALCIUM SCH MG: 20 TABLET, FILM COATED ORAL at 22:54

## 2020-06-28 RX ADMIN — FUROSEMIDE SCH MG: 40 TABLET ORAL at 09:18

## 2020-06-28 RX ADMIN — HUMAN INSULIN SCH UNITS: 100 INJECTION, SOLUTION SUBCUTANEOUS at 12:18

## 2020-06-28 RX ADMIN — GABAPENTIN SCH MG: 300 CAPSULE ORAL at 09:17

## 2020-06-28 RX ADMIN — ALBUTEROL SULFATE SCH MG: 2.5 SOLUTION RESPIRATORY (INHALATION) at 05:44

## 2020-06-28 NOTE — NUR
Dr. Blevins at bedside

Discussing plan of care with patient and this RN. All questions and concerns addressed. Will 
continue to monitor Q1 hour and PRN.

## 2020-06-28 NOTE — NUR
Opening Note

Received report from night shift RN. Patient is resting in  bed with eyes closed, easy to 
wake by calling name. No signs or symptoms of distress noted at this time. Patient is on 2L 
NC, respirations even and unlabored. Bed in low and locked position, call light within 
reach. Will continue to monitor Q1 hour and PRN.

## 2020-06-28 NOTE — NUR
Opening shift note

Assumed care of patient who is sitting up in the bedside chair reading, A&Ox4, respirations 
even and non-labored with no s/s of distress. IV patent and saline locked at this time. 
Urinal bedside. Bed in lowest locked position with 2 side rails up. Patient has call light 
within reach. Will continue to monitor.

## 2020-06-29 VITALS — DIASTOLIC BLOOD PRESSURE: 60 MMHG | SYSTOLIC BLOOD PRESSURE: 119 MMHG

## 2020-06-29 VITALS — DIASTOLIC BLOOD PRESSURE: 43 MMHG | SYSTOLIC BLOOD PRESSURE: 120 MMHG

## 2020-06-29 VITALS — DIASTOLIC BLOOD PRESSURE: 44 MMHG | SYSTOLIC BLOOD PRESSURE: 109 MMHG

## 2020-06-29 VITALS — SYSTOLIC BLOOD PRESSURE: 130 MMHG | DIASTOLIC BLOOD PRESSURE: 57 MMHG

## 2020-06-29 VITALS — DIASTOLIC BLOOD PRESSURE: 59 MMHG | SYSTOLIC BLOOD PRESSURE: 121 MMHG

## 2020-06-29 LAB
WBC CLUMPS UR QL AUTO: PRESENT /HPF
YEAST # UR AUTO: (no result) /HPF

## 2020-06-29 PROCEDURE — 5A1D70Z PERFORMANCE OF URINARY FILTRATION, INTERMITTENT, LESS THAN 6 HOURS PER DAY: ICD-10-PCS

## 2020-06-29 RX ADMIN — ZOLPIDEM TARTRATE PRN MG: 5 TABLET ORAL at 23:55

## 2020-06-29 RX ADMIN — IPRATROPIUM BROMIDE SCH MG: 0.5 SOLUTION RESPIRATORY (INHALATION) at 19:05

## 2020-06-29 RX ADMIN — HUMAN INSULIN SCH UNITS: 100 INJECTION, SOLUTION SUBCUTANEOUS at 14:02

## 2020-06-29 RX ADMIN — HUMAN INSULIN SCH UNITS: 100 INJECTION, SOLUTION SUBCUTANEOUS at 23:18

## 2020-06-29 RX ADMIN — HUMAN INSULIN SCH UNITS: 100 INJECTION, SOLUTION SUBCUTANEOUS at 18:38

## 2020-06-29 RX ADMIN — Medication SCH STRIP: at 23:14

## 2020-06-29 RX ADMIN — GABAPENTIN SCH MG: 300 CAPSULE ORAL at 21:47

## 2020-06-29 RX ADMIN — DOXAZOSIN SCH MG: 2 TABLET ORAL at 21:45

## 2020-06-29 RX ADMIN — ATORVASTATIN CALCIUM SCH MG: 20 TABLET, FILM COATED ORAL at 21:47

## 2020-06-29 RX ADMIN — GABAPENTIN SCH MG: 300 CAPSULE ORAL at 12:27

## 2020-06-29 RX ADMIN — IPRATROPIUM BROMIDE SCH MG: 0.5 SOLUTION RESPIRATORY (INHALATION) at 11:26

## 2020-06-29 RX ADMIN — Medication SCH STRIP: at 13:59

## 2020-06-29 RX ADMIN — ALBUTEROL SULFATE SCH MG: 2.5 SOLUTION RESPIRATORY (INHALATION) at 19:05

## 2020-06-29 RX ADMIN — Medication SCH STRIP: at 18:38

## 2020-06-29 RX ADMIN — HUMAN INSULIN SCH UNITS: 100 INJECTION, SOLUTION SUBCUTANEOUS at 12:31

## 2020-06-29 RX ADMIN — ALBUTEROL SULFATE SCH MG: 2.5 SOLUTION RESPIRATORY (INHALATION) at 05:18

## 2020-06-29 RX ADMIN — ALBUTEROL SULFATE SCH MG: 2.5 SOLUTION RESPIRATORY (INHALATION) at 11:26

## 2020-06-29 RX ADMIN — IPRATROPIUM BROMIDE SCH MG: 0.5 SOLUTION RESPIRATORY (INHALATION) at 05:18

## 2020-06-29 NOTE — NUR
SOCIAL SERVICE CONSULT



MSW SPOKE WITH PT'S GRANDSON MARY ALICE [phone] TO OBTAIN COLLATERAL INFORMATION FOR INITIAL 
ASSESSMENT. PT IS A 88 YR OLD [place] SPEAKING  MALE ADMITTED FOR PNA. HE HAS A HX 
OF DM, ESRCD ON DIALYSIS WITH [facility] IN [place], PACEMAKER, CHF, COPD. PT AMBULATES WITH 
A FWW, HE ALSO HAS A WHEELCHAIR, SHOWER CHAIR AT HOME. PT RESIDES WITH MARY ALICE, PT'S DAUGHTER 
ANDRE, SON IN LAW AND ANOTHER SON. FAMILY IS ATTENTIVE AND SUPPORTIVE THEY PROVIDE FULL 
TIME CARE. 

PT'S PCP IS DR. BARAKAT. PT HAS AN AHCD ON FILE. MSW CONFERRED WITH JANICE LEYVA, 
TENTATIVE DC PLAN IS FOR PT TO RETURN HOME WITH PT ON HOME HEALTH. NO OTHER SS NEEDS 
IDENTIFIED. SS TO REMAIN AVAILABLE AS NEEDED. 

-------------------------------------------------------------------------------

Addendum: 06/29/20 at 1004 by SÁNCHEZ SONG SS

-------------------------------------------------------------------------------

Amended: Links added.

## 2020-06-29 NOTE — NUR
IV insertion

IV access obtained, via clean sterile technique by inserting 20 gauge catheter at left FA 
after 2 attempts. IV secured properly. No trauma to site. Patient tolerated well.

## 2020-06-29 NOTE — NUR
DR. ARMENTA CALLED BACK

MADE AWARE URINALYSIS RESULT IS BACK, POSITIVE FOR UTI AND  3+ BLOOD, RECEIVED ORDER NOT TO 
GIVE ELIQUIS TODAY AND START TOMORROW, START ROCEPHIN 1 GRAM IVPB NOW AND DAILY, ORDER READ 
BACK AND VERIFIED.

## 2020-06-29 NOTE — NUR
Opening shift note

Assumed care of patient who is sitting up at a bedside chair A&Ox4. Respirations are even 
and non-labored and no s/s of distress. Discussed POC with patient who verbalized 
understanding. Bed in lowest locked position with 2 side rails up. Urinal bedside, call 
light within reach. Will continue to monitor.

## 2020-06-29 NOTE — NUR
Nutrition Followup Note 



Wt 75.6kg 



Pt was with care team at time of rounds.  Pt has inadequate oral intake x 2 days aeb pt with 
avg po intake of 55% per RN nutrition doc.  Pt last dialysis  per RN note



Est Energy needs BW 78 k-2106kcals (25-27 kcal/kgBW), Est Protein needs:  
gms/day (1.2-1.3 gm/kgBW r/t HD). Will continue to monitor and reassess prn.



Labs:  BUN 73H, Creat 4.24H, Ca 7.7L, Alb 3.5WNL 



BM:  1 BM  per RN doc 



Skin:  BS 18 mod risk, full details in RN wound care doc.  



PES:  Altered nutrition related lab values r.t current chronic medical condition aeb elev 
RFT hypercapnia, hypocalcemia



Comments 

1) consider CCHO 60 gm renal std cardiac diet

2) refer to CDE on DC

3) continue current plan of care



Expected Outcomes/Goals: 

pt will have improved labs

F/u mod 3-5 days

## 2020-06-30 VITALS — SYSTOLIC BLOOD PRESSURE: 124 MMHG | DIASTOLIC BLOOD PRESSURE: 58 MMHG

## 2020-06-30 VITALS — SYSTOLIC BLOOD PRESSURE: 132 MMHG | DIASTOLIC BLOOD PRESSURE: 57 MMHG

## 2020-06-30 VITALS — DIASTOLIC BLOOD PRESSURE: 68 MMHG | SYSTOLIC BLOOD PRESSURE: 147 MMHG

## 2020-06-30 VITALS — SYSTOLIC BLOOD PRESSURE: 120 MMHG | DIASTOLIC BLOOD PRESSURE: 43 MMHG

## 2020-06-30 VITALS — DIASTOLIC BLOOD PRESSURE: 46 MMHG | SYSTOLIC BLOOD PRESSURE: 123 MMHG

## 2020-06-30 VITALS — SYSTOLIC BLOOD PRESSURE: 122 MMHG | DIASTOLIC BLOOD PRESSURE: 49 MMHG

## 2020-06-30 RX ADMIN — HUMAN INSULIN SCH UNITS: 100 INJECTION, SOLUTION SUBCUTANEOUS at 12:57

## 2020-06-30 RX ADMIN — GABAPENTIN SCH MG: 300 CAPSULE ORAL at 22:14

## 2020-06-30 RX ADMIN — HUMAN INSULIN SCH UNITS: 100 INJECTION, SOLUTION SUBCUTANEOUS at 06:00

## 2020-06-30 RX ADMIN — IPRATROPIUM BROMIDE SCH MG: 0.5 SOLUTION RESPIRATORY (INHALATION) at 11:21

## 2020-06-30 RX ADMIN — HUMAN INSULIN SCH UNITS: 100 INJECTION, SOLUTION SUBCUTANEOUS at 18:00

## 2020-06-30 RX ADMIN — ALBUTEROL SULFATE SCH MG: 2.5 SOLUTION RESPIRATORY (INHALATION) at 11:21

## 2020-06-30 RX ADMIN — ALBUTEROL SULFATE SCH MG: 2.5 SOLUTION RESPIRATORY (INHALATION) at 18:26

## 2020-06-30 RX ADMIN — ATORVASTATIN CALCIUM SCH MG: 20 TABLET, FILM COATED ORAL at 22:14

## 2020-06-30 RX ADMIN — Medication SCH STRIP: at 06:49

## 2020-06-30 RX ADMIN — GABAPENTIN SCH MG: 300 CAPSULE ORAL at 09:08

## 2020-06-30 RX ADMIN — Medication SCH STRIP: at 12:55

## 2020-06-30 RX ADMIN — ALBUTEROL SULFATE SCH MG: 2.5 SOLUTION RESPIRATORY (INHALATION) at 05:32

## 2020-06-30 RX ADMIN — DOXAZOSIN SCH MG: 2 TABLET ORAL at 22:00

## 2020-06-30 RX ADMIN — IPRATROPIUM BROMIDE SCH MG: 0.5 SOLUTION RESPIRATORY (INHALATION) at 05:32

## 2020-06-30 RX ADMIN — IPRATROPIUM BROMIDE SCH MG: 0.5 SOLUTION RESPIRATORY (INHALATION) at 18:26

## 2020-06-30 RX ADMIN — Medication SCH STRIP: at 18:05

## 2020-06-30 RX ADMIN — CEFTRIAXONE SODIUM SCH MLS/HR: 1 INJECTION, POWDER, FOR SOLUTION INTRAMUSCULAR; INTRAVENOUS at 09:07

## 2020-06-30 NOTE — NUR
Closing shift note

Patient awake, sitting up in bed with no s/s of distress. Endorsed care to day shift RN.

## 2020-06-30 NOTE — NUR
PATIENT SITTING UP AT BEDSIDE IN CHAIR WITH NO S/S OF DISTRESS NOTED. PATIENT ON 02 AT 2LNC 
WITH NO RESPIRATORY DISTRESS NOTED. PATIENT IS Wolof SPEAKING, PLAN OF CARE TRANSLATED TO 
PATIENT VIA STAFF, ALL QUESTIONS AND CONCERNS ADDRESSED. NEW GOWN PROVIDED TO PATIENT AS 
REQUESTED. PATIENT WISHES TO STAY SITTING IN CHAIR AT THIS TIME, WISHES TO RETURN TO BED AT 
A LATER TIME. DIALYSIS CATH SITE TO RIGHT UPPER CHEST IS BENIGN, NO S/S OF BLEEDING NOTED. 
DRESSING TO RIGHT UPPER ARM C/D/I. NO BLEEDING NOTED.

## 2020-06-30 NOTE — NUR
Dr Borges called and questioned why the patient is not being discharged. Informed Dr Borges of 
the plan of care. Dr Borges that he would call Dr Miranda and have the patient discharged.

## 2020-07-01 VITALS — DIASTOLIC BLOOD PRESSURE: 54 MMHG | SYSTOLIC BLOOD PRESSURE: 100 MMHG

## 2020-07-01 VITALS — SYSTOLIC BLOOD PRESSURE: 138 MMHG | DIASTOLIC BLOOD PRESSURE: 51 MMHG

## 2020-07-01 VITALS — SYSTOLIC BLOOD PRESSURE: 93 MMHG | DIASTOLIC BLOOD PRESSURE: 63 MMHG

## 2020-07-01 VITALS — SYSTOLIC BLOOD PRESSURE: 99 MMHG | DIASTOLIC BLOOD PRESSURE: 37 MMHG

## 2020-07-01 VITALS — SYSTOLIC BLOOD PRESSURE: 125 MMHG | DIASTOLIC BLOOD PRESSURE: 62 MMHG

## 2020-07-01 LAB
ANION GAP SERPL CALCULATED.3IONS-SCNC: 5 MMOL/L (ref 5–15)
BUN SERPL-MCNC: 73 MG/DL (ref 7–18)
BUN/CREAT SERPL: 15.7
CALCIUM SERPL-MCNC: 8.3 MG/DL (ref 8.5–10.1)
CHLORIDE SERPL-SCNC: 98 MMOL/L (ref 98–107)
CO2 SERPL-SCNC: 30 MMOL/L (ref 21–32)
GLUCOSE SERPL-MCNC: 69 MG/DL (ref 74–106)
HCT VFR BLD AUTO: 31.9 % (ref 41–53)
HGB BLD-MCNC: 10.4 G/DL (ref 13.5–17.5)
MCH RBC QN AUTO: 28.2 PG (ref 28–32)
MCV RBC AUTO: 86.7 FL (ref 80–100)
NRBC BLD QL AUTO: 0.1 %
POTASSIUM SERPL-SCNC: 5.2 MMOL/L (ref 3.5–5.1)
SODIUM SERPL-SCNC: 133 MMOL/L (ref 136–145)

## 2020-07-01 PROCEDURE — 5A1D70Z PERFORMANCE OF URINARY FILTRATION, INTERMITTENT, LESS THAN 6 HOURS PER DAY: ICD-10-PCS

## 2020-07-01 RX ADMIN — HUMAN INSULIN SCH UNITS: 100 INJECTION, SOLUTION SUBCUTANEOUS at 11:49

## 2020-07-01 RX ADMIN — GABAPENTIN SCH MG: 300 CAPSULE ORAL at 22:20

## 2020-07-01 RX ADMIN — CEFTRIAXONE SODIUM SCH MLS/HR: 1 INJECTION, POWDER, FOR SOLUTION INTRAMUSCULAR; INTRAVENOUS at 08:41

## 2020-07-01 RX ADMIN — DOXAZOSIN SCH MG: 2 TABLET ORAL at 00:33

## 2020-07-01 RX ADMIN — ALBUTEROL SULFATE SCH MG: 2.5 SOLUTION RESPIRATORY (INHALATION) at 12:16

## 2020-07-01 RX ADMIN — HUMAN INSULIN SCH UNITS: 100 INJECTION, SOLUTION SUBCUTANEOUS at 00:47

## 2020-07-01 RX ADMIN — IPRATROPIUM BROMIDE SCH MG: 0.5 SOLUTION RESPIRATORY (INHALATION) at 12:16

## 2020-07-01 RX ADMIN — Medication SCH STRIP: at 00:33

## 2020-07-01 RX ADMIN — ZOLPIDEM TARTRATE PRN MG: 5 TABLET ORAL at 22:24

## 2020-07-01 RX ADMIN — DOXAZOSIN SCH MG: 2 TABLET ORAL at 22:20

## 2020-07-01 RX ADMIN — HUMAN INSULIN SCH UNITS: 100 INJECTION, SOLUTION SUBCUTANEOUS at 05:43

## 2020-07-01 RX ADMIN — ATORVASTATIN CALCIUM SCH MG: 20 TABLET, FILM COATED ORAL at 22:20

## 2020-07-01 RX ADMIN — IPRATROPIUM BROMIDE SCH MG: 0.5 SOLUTION RESPIRATORY (INHALATION) at 18:16

## 2020-07-01 RX ADMIN — IPRATROPIUM BROMIDE SCH MG: 0.5 SOLUTION RESPIRATORY (INHALATION) at 07:30

## 2020-07-01 RX ADMIN — ALBUTEROL SULFATE SCH MG: 2.5 SOLUTION RESPIRATORY (INHALATION) at 07:30

## 2020-07-01 RX ADMIN — HUMAN INSULIN SCH UNITS: 100 INJECTION, SOLUTION SUBCUTANEOUS at 17:36

## 2020-07-01 RX ADMIN — Medication SCH STRIP: at 17:36

## 2020-07-01 RX ADMIN — GABAPENTIN SCH MG: 300 CAPSULE ORAL at 08:41

## 2020-07-01 RX ADMIN — ALBUTEROL SULFATE SCH MG: 2.5 SOLUTION RESPIRATORY (INHALATION) at 18:16

## 2020-07-01 RX ADMIN — Medication SCH STRIP: at 11:46

## 2020-07-01 RX ADMIN — Medication SCH STRIP: at 05:43

## 2020-07-01 NOTE — NUR
Opening Note

Assumed pt care from NOC RN. Pt is a/ox4 with no s/s of distress or SOB. Pt is currently 
laying in bed with no complaints at this time. Dialysis nurse is currently at bedside for 
treatment. Discussed POC with pt; pt is still refusing roy at this time. Safety measures 
maintained with call light within reach, bed in lowest position and side rails up. Will 
continue to monitor.

## 2020-07-01 NOTE — NUR
Roy Catheter

Attempt to place roy catheter. With the help of Kena GOODRICH, translating, pt states that he 
does not really wish to have the catheter placed. Per the patient, he has had a catheter at 
one time to which resulted in him not "being able to void right since". Discussed purpose of 
insertion of catheter, pt stated that if the MD requests the placement, he will do it. 
Requested that we place it later this evening. 

At this time, pt has been able to void in urinal, 300mL Dark/reddish nataly urine present. No 
pain associated with urinating per pt. 

Will continue to monitor and place catheter.

## 2020-07-01 NOTE — NUR
Dr Polk at Bedside

MD to see pt. Discussed POC. Requests that we check pt's urine. Will continue to monitor.

## 2020-07-01 NOTE — NUR
Respiratory note:

PT DID NOT WANT MEDNEB TX AT THIS TIME BECAUSE CURRENTLY ON DIALYSIS. RESTING IN BED, NO S/S 
OF RESPIRATORY DISTRESS NOTED. DIALYSIS NURSE AT BEDSIDE. WILL RETURN FOR NEXT SCHEDULED TX.

## 2020-07-01 NOTE — NUR
Urine

Pt able to produce 50mL of urine in urinal. Dark in color, mild hematuria noted. Will place 
roy catheter after lunch per pt's request. No pain to abdomen at this time. Will continue 
to monitor.

## 2020-07-01 NOTE — NUR
Insertion of Roy Catheter; ROXANNE Ramos at Bedside

18F roy placed using sterile technique. Pt tolerated well. Upon insertion, 175mL of yellow 
tinged/cloudy urine drained. Minimal hematuria present. 

ROXANNE Ramos at bedside post insertion. Per her, pt will most likely go home with the roy and 
see Dr Clifford as out pt. 

Pt requesting leg bag. Will educate pt and provide leg bag post initial drainage. 

Will continue to monitor.

## 2020-07-01 NOTE — NUR
Dialysis Complete

Per RN, 2L removed. Last Bp is 123/58 with a HR of 64. RN stated that he tolerated treatment 
well. Will continue to monitor.

## 2020-07-02 VITALS — SYSTOLIC BLOOD PRESSURE: 131 MMHG | DIASTOLIC BLOOD PRESSURE: 68 MMHG

## 2020-07-02 VITALS — SYSTOLIC BLOOD PRESSURE: 126 MMHG | DIASTOLIC BLOOD PRESSURE: 60 MMHG

## 2020-07-02 VITALS — SYSTOLIC BLOOD PRESSURE: 110 MMHG | DIASTOLIC BLOOD PRESSURE: 60 MMHG

## 2020-07-02 VITALS — DIASTOLIC BLOOD PRESSURE: 64 MMHG | SYSTOLIC BLOOD PRESSURE: 137 MMHG

## 2020-07-02 VITALS — DIASTOLIC BLOOD PRESSURE: 37 MMHG | SYSTOLIC BLOOD PRESSURE: 124 MMHG

## 2020-07-02 LAB
ANION GAP SERPL CALCULATED.3IONS-SCNC: 3 MMOL/L (ref 5–15)
BUN SERPL-MCNC: 55 MG/DL (ref 7–18)
BUN/CREAT SERPL: 15
CALCIUM SERPL-MCNC: 8.3 MG/DL (ref 8.5–10.1)
CHLORIDE SERPL-SCNC: 99 MMOL/L (ref 98–107)
CO2 SERPL-SCNC: 31 MMOL/L (ref 21–32)
GLUCOSE SERPL-MCNC: 68 MG/DL (ref 74–106)
HCT VFR BLD AUTO: 32.2 % (ref 41–53)
HGB BLD-MCNC: 10.7 G/DL (ref 13.5–17.5)
MCH RBC QN AUTO: 28.7 PG (ref 28–32)
MCV RBC AUTO: 86.8 FL (ref 80–100)
NRBC BLD QL AUTO: 0 %
POTASSIUM SERPL-SCNC: 5.1 MMOL/L (ref 3.5–5.1)
SODIUM SERPL-SCNC: 133 MMOL/L (ref 136–145)

## 2020-07-02 RX ADMIN — ATORVASTATIN CALCIUM SCH MG: 20 TABLET, FILM COATED ORAL at 23:04

## 2020-07-02 RX ADMIN — APIXABAN SCH MG: 2.5 TABLET, FILM COATED ORAL at 11:40

## 2020-07-02 RX ADMIN — Medication SCH STRIP: at 23:59

## 2020-07-02 RX ADMIN — ALBUTEROL SULFATE SCH MG: 2.5 SOLUTION RESPIRATORY (INHALATION) at 18:12

## 2020-07-02 RX ADMIN — IPRATROPIUM BROMIDE SCH MG: 0.5 SOLUTION RESPIRATORY (INHALATION) at 05:53

## 2020-07-02 RX ADMIN — HUMAN INSULIN SCH UNITS: 100 INJECTION, SOLUTION SUBCUTANEOUS at 11:26

## 2020-07-02 RX ADMIN — GABAPENTIN SCH MG: 300 CAPSULE ORAL at 09:05

## 2020-07-02 RX ADMIN — GABAPENTIN SCH MG: 300 CAPSULE ORAL at 23:04

## 2020-07-02 RX ADMIN — APIXABAN SCH MG: 2.5 TABLET, FILM COATED ORAL at 23:04

## 2020-07-02 RX ADMIN — HUMAN INSULIN SCH UNITS: 100 INJECTION, SOLUTION SUBCUTANEOUS at 17:24

## 2020-07-02 RX ADMIN — CEFTRIAXONE SODIUM SCH MLS/HR: 1 INJECTION, POWDER, FOR SOLUTION INTRAMUSCULAR; INTRAVENOUS at 09:05

## 2020-07-02 RX ADMIN — ZOLPIDEM TARTRATE PRN MG: 5 TABLET ORAL at 23:08

## 2020-07-02 RX ADMIN — Medication SCH STRIP: at 05:50

## 2020-07-02 RX ADMIN — ALBUTEROL SULFATE SCH MG: 2.5 SOLUTION RESPIRATORY (INHALATION) at 05:53

## 2020-07-02 RX ADMIN — Medication SCH STRIP: at 17:24

## 2020-07-02 RX ADMIN — Medication SCH STRIP: at 11:26

## 2020-07-02 RX ADMIN — DOXAZOSIN SCH MG: 2 TABLET ORAL at 23:04

## 2020-07-02 RX ADMIN — HUMAN INSULIN SCH UNITS: 100 INJECTION, SOLUTION SUBCUTANEOUS at 05:52

## 2020-07-02 RX ADMIN — HUMAN INSULIN SCH UNITS: 100 INJECTION, SOLUTION SUBCUTANEOUS at 00:20

## 2020-07-02 RX ADMIN — Medication SCH STRIP: at 00:19

## 2020-07-02 RX ADMIN — IPRATROPIUM BROMIDE SCH MG: 0.5 SOLUTION RESPIRATORY (INHALATION) at 18:12

## 2020-07-02 NOTE — NUR
D/C planning 



Per SS consult for home health for physical therapy and roy care. Faxed clinical 
information to [facility]. Per Tasia with [facility]  patient has 
been accepted and service to start within 24-48hrs upon d/c day.

## 2020-07-02 NOTE — NUR
Opening Note

Assumed pt care from NOC RN. Pt is a/ox4 with no s/s of distress or SOB. Pt is currently 
sitting upright in bed with no complaints at this time. Banks catheter is present, free of 
kinks and draining to gravity. Mild hematuria notes in bag. No pain to abdomen as stated by 
pt. Discussed POC with pt, pt verbalized understanding. Safety measures maintained with call 
light within reach, bed in lowest position and side rails up. Will continue to monitor.

## 2020-07-02 NOTE — NUR
Dr Polk at Bedside

MD to see pt. Plans to d/c pt home tomorrow. Will implement and continue to monitor.

## 2020-07-02 NOTE — NUR
Prescriptions for D/C

RX picked up from Pharmacy. Placed in med room. Will notify NOC RN for d/c plan tomorrow.

## 2020-07-02 NOTE — NUR
Updated Pt's Family on Status

Updated pt on status of pt and POC, spoke to Raphael. Family is aware for plans for d/c 
tomorrow. 

All questions were answered.

## 2020-07-02 NOTE — NUR
Faxed clinical information to [facility] requesting authorization for [facility]. 
[id number].

## 2020-07-03 VITALS — SYSTOLIC BLOOD PRESSURE: 90 MMHG | DIASTOLIC BLOOD PRESSURE: 37 MMHG

## 2020-07-03 VITALS — DIASTOLIC BLOOD PRESSURE: 47 MMHG | SYSTOLIC BLOOD PRESSURE: 131 MMHG

## 2020-07-03 VITALS — SYSTOLIC BLOOD PRESSURE: 111 MMHG | DIASTOLIC BLOOD PRESSURE: 55 MMHG

## 2020-07-03 VITALS — SYSTOLIC BLOOD PRESSURE: 124 MMHG | DIASTOLIC BLOOD PRESSURE: 37 MMHG

## 2020-07-03 PROCEDURE — 5A1D70Z PERFORMANCE OF URINARY FILTRATION, INTERMITTENT, LESS THAN 6 HOURS PER DAY: ICD-10-PCS

## 2020-07-03 RX ADMIN — Medication SCH STRIP: at 12:09

## 2020-07-03 RX ADMIN — IPRATROPIUM BROMIDE SCH MG: 0.5 SOLUTION RESPIRATORY (INHALATION) at 12:22

## 2020-07-03 RX ADMIN — ALBUTEROL SULFATE SCH MG: 2.5 SOLUTION RESPIRATORY (INHALATION) at 08:00

## 2020-07-03 RX ADMIN — HUMAN INSULIN SCH UNITS: 100 INJECTION, SOLUTION SUBCUTANEOUS at 12:19

## 2020-07-03 RX ADMIN — HUMAN INSULIN SCH UNITS: 100 INJECTION, SOLUTION SUBCUTANEOUS at 00:00

## 2020-07-03 RX ADMIN — ALBUTEROL SULFATE SCH MG: 2.5 SOLUTION RESPIRATORY (INHALATION) at 12:22

## 2020-07-03 RX ADMIN — APIXABAN SCH MG: 2.5 TABLET, FILM COATED ORAL at 10:00

## 2020-07-03 RX ADMIN — Medication SCH STRIP: at 06:08

## 2020-07-03 RX ADMIN — GABAPENTIN SCH MG: 300 CAPSULE ORAL at 10:00

## 2020-07-03 RX ADMIN — IPRATROPIUM BROMIDE SCH MG: 0.5 SOLUTION RESPIRATORY (INHALATION) at 08:00

## 2020-07-03 RX ADMIN — HUMAN INSULIN SCH UNITS: 100 INJECTION, SOLUTION SUBCUTANEOUS at 06:00

## 2020-07-03 RX ADMIN — CEFTRIAXONE SODIUM SCH MLS/HR: 1 INJECTION, POWDER, FOR SOLUTION INTRAMUSCULAR; INTRAVENOUS at 10:00

## 2020-07-03 NOTE — NUR
OPENING SHIFT NOTE

ASSUMED CARE OF PATIENT FROM NIGHT SHIFT RN KEMAR. PATIENT IS AWAKE AND ALERT X4. PATIENT 
HAS NO S/S OF DISTRESS/SOB OR PAIN. INSTRUCTED PATIENT ON POC, PATIENT VERBALIZED 
UNDERSTANDING. DIALYSIS BEING DONE AT THIS TIME. BED IS IN LOWEST POSITION WITH SIDE RAILS 
RAISED X2, BED WHEELS LOCKED, SINGH IS HANGING BELOW BLADDER AND IS DRAINING YELLOW URINE, 
AND CALL LIGHT IS WITHIN REACH. WILL CONTINUE TO MONITOR.

## 2020-07-03 NOTE — NUR
Discharge instructions given as ordered. Encourage to follow up with PMD as instructed. All 
questions and concerns addressed. Patient verbalized understanding.  Medication 
reconciliation form completed and copy given to patient. IV removed with catheter intact, 
pressure dressing applied.  Telemetry unit returned to ICU. Patient taken to vehicle via 
wheelchair with all personal belongings, accompanied by staff and family member. No distress 
noted at time of departure.

## 2020-07-03 NOTE — NUR
Nutrition Followup Note 



Wt 76.4 kg 



Pt was with care team at time of rounds.  Pt with adequate po intake aeb pt with 87.5% po 
since  per Rn nutrition note.  Pt last dialysis  per RN note



Est Energy needs BW 78 k-2106kcals (25-27 kcal/kgBW), Est Protein needs:  
gms/day (1.2-1.3 gm/kgBW r/t HD). Will continue to monitor and reassess prn.



Labs:  BUN 55H, Ca 8.3L, Creat 3.67H, Gluc 164H, Alb 3.5 WNL



BM:  1 BM  per RN doc 



Skin:  BS 18 mod risk, full details in RN wound care doc.  



PES:  Altered nutrition related lab values r.t current chronic medical condition aeb elev 
RFT hypercapnia, hypocalcemia



Comments 

     1) consider CCHO 60 gm renal std cardiac diet

     2) refer to CDE on DC

     3) continue current plan of care



Expected Outcomes/Goals: 

     pt will have improved labs

     F/u mod 3-5 days

## 2020-08-02 ENCOUNTER — HOSPITAL ENCOUNTER (EMERGENCY)
Dept: HOSPITAL 15 - ER | Age: 85
Discharge: HOME | End: 2020-08-02
Payer: MEDICAID

## 2020-08-02 VITALS — SYSTOLIC BLOOD PRESSURE: 103 MMHG | DIASTOLIC BLOOD PRESSURE: 38 MMHG

## 2020-08-02 VITALS — HEIGHT: 70 IN | BODY MASS INDEX: 25.05 KG/M2 | WEIGHT: 175 LBS

## 2020-08-02 DIAGNOSIS — T83.511A: Primary | ICD-10-CM

## 2020-08-02 PROCEDURE — 81002 URINALYSIS NONAUTO W/O SCOPE: CPT

## 2020-08-02 PROCEDURE — 51702 INSERT TEMP BLADDER CATH: CPT

## 2020-09-14 PROCEDURE — 5A1D70Z PERFORMANCE OF URINARY FILTRATION, INTERMITTENT, LESS THAN 6 HOURS PER DAY: ICD-10-PCS | Performed by: EMERGENCY MEDICINE

## 2020-09-15 ENCOUNTER — HOSPITAL ENCOUNTER (INPATIENT)
Dept: HOSPITAL 15 - ER | Age: 85
LOS: 2 days | Discharge: HOME | DRG: 52 | End: 2020-09-17
Attending: INTERNAL MEDICINE | Admitting: FAMILY MEDICINE
Payer: MEDICAID

## 2020-09-15 VITALS — SYSTOLIC BLOOD PRESSURE: 112 MMHG | DIASTOLIC BLOOD PRESSURE: 58 MMHG

## 2020-09-15 VITALS — WEIGHT: 169.98 LBS | BODY MASS INDEX: 25.18 KG/M2 | HEIGHT: 69 IN

## 2020-09-15 DIAGNOSIS — Z82.49: ICD-10-CM

## 2020-09-15 DIAGNOSIS — I50.32: ICD-10-CM

## 2020-09-15 DIAGNOSIS — Z79.899: ICD-10-CM

## 2020-09-15 DIAGNOSIS — E87.1: ICD-10-CM

## 2020-09-15 DIAGNOSIS — J44.9: ICD-10-CM

## 2020-09-15 DIAGNOSIS — Z99.2: ICD-10-CM

## 2020-09-15 DIAGNOSIS — F32.9: ICD-10-CM

## 2020-09-15 DIAGNOSIS — N39.0: ICD-10-CM

## 2020-09-15 DIAGNOSIS — E11.22: ICD-10-CM

## 2020-09-15 DIAGNOSIS — I13.2: ICD-10-CM

## 2020-09-15 DIAGNOSIS — E78.5: ICD-10-CM

## 2020-09-15 DIAGNOSIS — N18.6: ICD-10-CM

## 2020-09-15 DIAGNOSIS — Z20.828: ICD-10-CM

## 2020-09-15 DIAGNOSIS — E11.65: ICD-10-CM

## 2020-09-15 DIAGNOSIS — G93.41: Primary | ICD-10-CM

## 2020-09-15 DIAGNOSIS — F03.90: ICD-10-CM

## 2020-09-15 DIAGNOSIS — F41.9: ICD-10-CM

## 2020-09-15 DIAGNOSIS — E87.5: ICD-10-CM

## 2020-09-15 DIAGNOSIS — Z79.4: ICD-10-CM

## 2020-09-15 LAB
ALBUMIN SERPL-MCNC: 3.3 G/DL (ref 3.4–5)
ALP SERPL-CCNC: 75 U/L (ref 45–117)
ALT SERPL-CCNC: 16 U/L (ref 16–61)
ANION GAP SERPL CALCULATED.3IONS-SCNC: 7 MMOL/L (ref 5–15)
BILIRUB SERPL-MCNC: 0.4 MG/DL (ref 0.2–1)
BUN SERPL-MCNC: 40 MG/DL (ref 7–18)
BUN/CREAT SERPL: 10.2
CALCIUM SERPL-MCNC: 8.6 MG/DL (ref 8.5–10.1)
CHLORIDE SERPL-SCNC: 92 MMOL/L (ref 98–107)
CO2 SERPL-SCNC: 28 MMOL/L (ref 21–32)
GLUCOSE SERPL-MCNC: 168 MG/DL (ref 74–106)
HCT VFR BLD AUTO: 34.5 % (ref 41–53)
HGB BLD-MCNC: 10.9 G/DL (ref 13.5–17.5)
MCH RBC QN AUTO: 28.4 PG (ref 28–32)
MCV RBC AUTO: 90.1 FL (ref 80–100)
NRBC BLD QL AUTO: 0 %
POTASSIUM SERPL-SCNC: 6.4 MMOL/L (ref 3.5–5.1)
PROT SERPL-MCNC: 7.6 G/DL (ref 6.4–8.2)
SODIUM SERPL-SCNC: 127 MMOL/L (ref 136–145)
WBC CLUMPS UR QL AUTO: PRESENT /HPF

## 2020-09-15 PROCEDURE — 84443 ASSAY THYROID STIM HORMONE: CPT

## 2020-09-15 PROCEDURE — 87040 BLOOD CULTURE FOR BACTERIA: CPT

## 2020-09-15 PROCEDURE — 85379 FIBRIN DEGRADATION QUANT: CPT

## 2020-09-15 PROCEDURE — 85025 COMPLETE CBC W/AUTO DIFF WBC: CPT

## 2020-09-15 PROCEDURE — 83615 LACTATE (LD) (LDH) ENZYME: CPT

## 2020-09-15 PROCEDURE — 80053 COMPREHEN METABOLIC PANEL: CPT

## 2020-09-15 PROCEDURE — 93005 ELECTROCARDIOGRAM TRACING: CPT

## 2020-09-15 PROCEDURE — 82728 ASSAY OF FERRITIN: CPT

## 2020-09-15 PROCEDURE — 84484 ASSAY OF TROPONIN QUANT: CPT

## 2020-09-15 PROCEDURE — 90935 HEMODIALYSIS ONE EVALUATION: CPT

## 2020-09-15 PROCEDURE — 87086 URINE CULTURE/COLONY COUNT: CPT

## 2020-09-15 PROCEDURE — 80048 BASIC METABOLIC PNL TOTAL CA: CPT

## 2020-09-15 PROCEDURE — 84132 ASSAY OF SERUM POTASSIUM: CPT

## 2020-09-15 PROCEDURE — 83735 ASSAY OF MAGNESIUM: CPT

## 2020-09-15 PROCEDURE — 86141 C-REACTIVE PROTEIN HS: CPT

## 2020-09-15 PROCEDURE — 71045 X-RAY EXAM CHEST 1 VIEW: CPT

## 2020-09-15 PROCEDURE — 87426 SARSCOV CORONAVIRUS AG IA: CPT

## 2020-09-15 PROCEDURE — 96365 THER/PROPH/DIAG IV INF INIT: CPT

## 2020-09-15 PROCEDURE — 96375 TX/PRO/DX INJ NEW DRUG ADDON: CPT

## 2020-09-15 PROCEDURE — 81001 URINALYSIS AUTO W/SCOPE: CPT

## 2020-09-15 PROCEDURE — 36415 COLL VENOUS BLD VENIPUNCTURE: CPT

## 2020-09-15 PROCEDURE — 94640 AIRWAY INHALATION TREATMENT: CPT

## 2020-09-15 PROCEDURE — 70450 CT HEAD/BRAIN W/O DYE: CPT

## 2020-09-15 PROCEDURE — 94644 CONT INHLJ TX 1ST HOUR: CPT

## 2020-09-15 PROCEDURE — 83036 HEMOGLOBIN GLYCOSYLATED A1C: CPT

## 2020-09-15 RX ADMIN — SODIUM CHLORIDE SCH MLS/HR: 0.9 INJECTION, SOLUTION INTRAVENOUS at 17:45

## 2020-09-15 RX ADMIN — CLINDAMYCIN PHOSPHATE SCH MLS/HR: 6 INJECTION, SOLUTION INTRAVENOUS at 21:48

## 2020-09-16 VITALS — SYSTOLIC BLOOD PRESSURE: 107 MMHG | DIASTOLIC BLOOD PRESSURE: 45 MMHG

## 2020-09-16 VITALS — DIASTOLIC BLOOD PRESSURE: 52 MMHG | SYSTOLIC BLOOD PRESSURE: 109 MMHG

## 2020-09-16 VITALS — SYSTOLIC BLOOD PRESSURE: 109 MMHG | DIASTOLIC BLOOD PRESSURE: 52 MMHG

## 2020-09-16 VITALS — SYSTOLIC BLOOD PRESSURE: 108 MMHG | DIASTOLIC BLOOD PRESSURE: 48 MMHG

## 2020-09-16 VITALS — SYSTOLIC BLOOD PRESSURE: 112 MMHG | DIASTOLIC BLOOD PRESSURE: 51 MMHG

## 2020-09-16 VITALS — DIASTOLIC BLOOD PRESSURE: 44 MMHG | SYSTOLIC BLOOD PRESSURE: 107 MMHG

## 2020-09-16 LAB
ALBUMIN SERPL-MCNC: 2.8 G/DL (ref 3.4–5)
ALP SERPL-CCNC: 59 U/L (ref 45–117)
ALT SERPL-CCNC: 13 U/L (ref 16–61)
ANION GAP SERPL CALCULATED.3IONS-SCNC: 7 MMOL/L (ref 5–15)
BILIRUB SERPL-MCNC: 0.4 MG/DL (ref 0.2–1)
BUN SERPL-MCNC: 49 MG/DL (ref 7–18)
BUN/CREAT SERPL: 10.2
CALCIUM SERPL-MCNC: 8.4 MG/DL (ref 8.5–10.1)
CHLORIDE SERPL-SCNC: 98 MMOL/L (ref 98–107)
CO2 SERPL-SCNC: 28 MMOL/L (ref 21–32)
GLUCOSE SERPL-MCNC: 127 MG/DL (ref 74–106)
HCT VFR BLD AUTO: 30.2 % (ref 41–53)
HGB BLD-MCNC: 9.5 G/DL (ref 13.5–17.5)
MAGNESIUM SERPL-MCNC: 2.4 MG/DL (ref 1.6–2.6)
MCH RBC QN AUTO: 28.1 PG (ref 28–32)
MCV RBC AUTO: 89.7 FL (ref 80–100)
NRBC BLD QL AUTO: 0 %
POTASSIUM SERPL-SCNC: 5.2 MMOL/L (ref 3.5–5.1)
PROT SERPL-MCNC: 6.5 G/DL (ref 6.4–8.2)
SODIUM SERPL-SCNC: 133 MMOL/L (ref 136–145)

## 2020-09-16 RX ADMIN — ALBUTEROL SULFATE SCH MG: 2.5 SOLUTION RESPIRATORY (INHALATION) at 08:05

## 2020-09-16 RX ADMIN — SODIUM CHLORIDE SCH MLS/HR: 0.9 INJECTION, SOLUTION INTRAVENOUS at 18:21

## 2020-09-16 RX ADMIN — BUDESONIDE SCH MG: 0.5 SUSPENSION RESPIRATORY (INHALATION) at 22:26

## 2020-09-16 RX ADMIN — Medication SCH MG: at 10:16

## 2020-09-16 RX ADMIN — PANTOPRAZOLE SODIUM SCH MG: 40 INJECTION, POWDER, FOR SOLUTION INTRAVENOUS at 10:16

## 2020-09-16 RX ADMIN — ENOXAPARIN SODIUM SCH MG: 30 INJECTION SUBCUTANEOUS at 10:15

## 2020-09-16 RX ADMIN — CLINDAMYCIN PHOSPHATE SCH MLS/HR: 6 INJECTION, SOLUTION INTRAVENOUS at 14:35

## 2020-09-16 RX ADMIN — ALBUTEROL SULFATE SCH MG: 2.5 SOLUTION RESPIRATORY (INHALATION) at 22:26

## 2020-09-16 RX ADMIN — CEFTRIAXONE SODIUM SCH MLS/HR: 1 INJECTION, POWDER, FOR SOLUTION INTRAMUSCULAR; INTRAVENOUS at 10:15

## 2020-09-16 RX ADMIN — BUDESONIDE SCH MG: 0.5 SUSPENSION RESPIRATORY (INHALATION) at 08:06

## 2020-09-16 RX ADMIN — ALBUTEROL SULFATE SCH MG: 2.5 SOLUTION RESPIRATORY (INHALATION) at 15:07

## 2020-09-16 RX ADMIN — CLINDAMYCIN PHOSPHATE SCH MLS/HR: 6 INJECTION, SOLUTION INTRAVENOUS at 06:00

## 2020-09-16 RX ADMIN — ZINC SULFATE CAP 220 MG (50 MG ELEMENTAL ZN) SCH MG: 220 (50 ZN) CAP at 10:16

## 2020-09-16 RX ADMIN — CLINDAMYCIN PHOSPHATE SCH MLS/HR: 6 INJECTION, SOLUTION INTRAVENOUS at 21:37

## 2020-09-17 VITALS — DIASTOLIC BLOOD PRESSURE: 67 MMHG | SYSTOLIC BLOOD PRESSURE: 116 MMHG

## 2020-09-17 VITALS — SYSTOLIC BLOOD PRESSURE: 113 MMHG | DIASTOLIC BLOOD PRESSURE: 47 MMHG

## 2020-09-17 VITALS — DIASTOLIC BLOOD PRESSURE: 45 MMHG | SYSTOLIC BLOOD PRESSURE: 119 MMHG

## 2020-09-17 LAB
ANION GAP SERPL CALCULATED.3IONS-SCNC: 4 MMOL/L (ref 5–15)
BUN SERPL-MCNC: 32 MG/DL (ref 7–18)
BUN/CREAT SERPL: 8.9
CALCIUM SERPL-MCNC: 8.2 MG/DL (ref 8.5–10.1)
CHLORIDE SERPL-SCNC: 101 MMOL/L (ref 98–107)
CO2 SERPL-SCNC: 30 MMOL/L (ref 21–32)
GLUCOSE SERPL-MCNC: 90 MG/DL (ref 74–106)
HCT VFR BLD AUTO: 30.6 % (ref 41–53)
HGB BLD-MCNC: 9.6 G/DL (ref 13.5–17.5)
MCH RBC QN AUTO: 28.3 PG (ref 28–32)
MCV RBC AUTO: 90.2 FL (ref 80–100)
NRBC BLD QL AUTO: 0 %
POTASSIUM SERPL-SCNC: 4.6 MMOL/L (ref 3.5–5.1)
SODIUM SERPL-SCNC: 135 MMOL/L (ref 136–145)

## 2020-09-17 RX ADMIN — CEFTRIAXONE SODIUM SCH MLS/HR: 1 INJECTION, POWDER, FOR SOLUTION INTRAMUSCULAR; INTRAVENOUS at 09:49

## 2020-09-17 RX ADMIN — Medication SCH MG: at 09:50

## 2020-09-17 RX ADMIN — PANTOPRAZOLE SODIUM SCH MG: 40 INJECTION, POWDER, FOR SOLUTION INTRAVENOUS at 09:49

## 2020-09-17 RX ADMIN — BUDESONIDE SCH MG: 0.5 SUSPENSION RESPIRATORY (INHALATION) at 06:34

## 2020-09-17 RX ADMIN — CLINDAMYCIN PHOSPHATE SCH MLS/HR: 6 INJECTION, SOLUTION INTRAVENOUS at 14:00

## 2020-09-17 RX ADMIN — ZINC SULFATE CAP 220 MG (50 MG ELEMENTAL ZN) SCH MG: 220 (50 ZN) CAP at 09:49

## 2020-09-17 RX ADMIN — ENOXAPARIN SODIUM SCH MG: 30 INJECTION SUBCUTANEOUS at 09:49

## 2020-09-17 RX ADMIN — CLINDAMYCIN PHOSPHATE SCH MLS/HR: 6 INJECTION, SOLUTION INTRAVENOUS at 06:03

## 2020-09-17 RX ADMIN — ALBUTEROL SULFATE SCH MG: 2.5 SOLUTION RESPIRATORY (INHALATION) at 06:33

## 2020-10-09 ENCOUNTER — HOSPITAL ENCOUNTER (INPATIENT)
Dept: HOSPITAL 15 - ER | Age: 85
LOS: 3 days | Discharge: HOME | DRG: 111 | End: 2020-10-12
Attending: INTERNAL MEDICINE | Admitting: FAMILY MEDICINE
Payer: MEDICAID

## 2020-10-09 VITALS — DIASTOLIC BLOOD PRESSURE: 43 MMHG | SYSTOLIC BLOOD PRESSURE: 109 MMHG

## 2020-10-09 VITALS — WEIGHT: 175.49 LBS | HEIGHT: 68 IN | BODY MASS INDEX: 26.6 KG/M2

## 2020-10-09 VITALS — SYSTOLIC BLOOD PRESSURE: 109 MMHG | DIASTOLIC BLOOD PRESSURE: 43 MMHG

## 2020-10-09 VITALS — SYSTOLIC BLOOD PRESSURE: 114 MMHG | DIASTOLIC BLOOD PRESSURE: 47 MMHG

## 2020-10-09 DIAGNOSIS — N18.6: ICD-10-CM

## 2020-10-09 DIAGNOSIS — D63.8: ICD-10-CM

## 2020-10-09 DIAGNOSIS — I50.32: ICD-10-CM

## 2020-10-09 DIAGNOSIS — J98.11: ICD-10-CM

## 2020-10-09 DIAGNOSIS — N39.0: ICD-10-CM

## 2020-10-09 DIAGNOSIS — E87.1: ICD-10-CM

## 2020-10-09 DIAGNOSIS — I42.9: ICD-10-CM

## 2020-10-09 DIAGNOSIS — E44.0: ICD-10-CM

## 2020-10-09 DIAGNOSIS — E11.22: ICD-10-CM

## 2020-10-09 DIAGNOSIS — E11.65: ICD-10-CM

## 2020-10-09 DIAGNOSIS — E83.42: ICD-10-CM

## 2020-10-09 DIAGNOSIS — R09.89: ICD-10-CM

## 2020-10-09 DIAGNOSIS — J44.9: ICD-10-CM

## 2020-10-09 DIAGNOSIS — Z99.2: ICD-10-CM

## 2020-10-09 DIAGNOSIS — E87.5: ICD-10-CM

## 2020-10-09 DIAGNOSIS — I13.2: ICD-10-CM

## 2020-10-09 DIAGNOSIS — Z82.49: ICD-10-CM

## 2020-10-09 DIAGNOSIS — H83.09: Primary | ICD-10-CM

## 2020-10-09 LAB
ALBUMIN SERPL-MCNC: 2.9 G/DL (ref 3.4–5)
ALP SERPL-CCNC: 69 U/L (ref 45–117)
ALT SERPL-CCNC: 22 U/L (ref 16–61)
ANION GAP SERPL CALCULATED.3IONS-SCNC: 8 MMOL/L (ref 5–15)
BILIRUB SERPL-MCNC: 0.5 MG/DL (ref 0.2–1)
BUN SERPL-MCNC: 81 MG/DL (ref 7–18)
BUN/CREAT SERPL: 13
CALCIUM SERPL-MCNC: 8.2 MG/DL (ref 8.5–10.1)
CHLORIDE SERPL-SCNC: 96 MMOL/L (ref 98–107)
CO2 SERPL-SCNC: 27 MMOL/L (ref 21–32)
GLUCOSE SERPL-MCNC: 205 MG/DL (ref 74–106)
HCT VFR BLD AUTO: 34.9 % (ref 41–53)
HGB BLD-MCNC: 11.1 G/DL (ref 13.5–17.5)
MAGNESIUM SERPL-MCNC: 2.7 MG/DL (ref 1.6–2.6)
MCH RBC QN AUTO: 27.8 PG (ref 28–32)
MCV RBC AUTO: 87.1 FL (ref 80–100)
NRBC BLD QL AUTO: 0 %
POTASSIUM SERPL-SCNC: 6.1 MMOL/L (ref 3.5–5.1)
PROT SERPL-MCNC: 7.4 G/DL (ref 6.4–8.2)
SODIUM SERPL-SCNC: 131 MMOL/L (ref 136–145)
WBC CLUMPS UR QL AUTO: PRESENT /HPF
YEAST # UR AUTO: (no result) /HPF

## 2020-10-09 PROCEDURE — 87086 URINE CULTURE/COLONY COUNT: CPT

## 2020-10-09 PROCEDURE — 82962 GLUCOSE BLOOD TEST: CPT

## 2020-10-09 PROCEDURE — 83036 HEMOGLOBIN GLYCOSYLATED A1C: CPT

## 2020-10-09 PROCEDURE — 71045 X-RAY EXAM CHEST 1 VIEW: CPT

## 2020-10-09 PROCEDURE — 36415 COLL VENOUS BLD VENIPUNCTURE: CPT

## 2020-10-09 PROCEDURE — 85730 THROMBOPLASTIN TIME PARTIAL: CPT

## 2020-10-09 PROCEDURE — 99291 CRITICAL CARE FIRST HOUR: CPT

## 2020-10-09 PROCEDURE — 90935 HEMODIALYSIS ONE EVALUATION: CPT

## 2020-10-09 PROCEDURE — 80061 LIPID PANEL: CPT

## 2020-10-09 PROCEDURE — 87081 CULTURE SCREEN ONLY: CPT

## 2020-10-09 PROCEDURE — 80053 COMPREHEN METABOLIC PANEL: CPT

## 2020-10-09 PROCEDURE — 80048 BASIC METABOLIC PNL TOTAL CA: CPT

## 2020-10-09 PROCEDURE — 81001 URINALYSIS AUTO W/SCOPE: CPT

## 2020-10-09 PROCEDURE — 84484 ASSAY OF TROPONIN QUANT: CPT

## 2020-10-09 PROCEDURE — 94640 AIRWAY INHALATION TREATMENT: CPT

## 2020-10-09 PROCEDURE — 51702 INSERT TEMP BLADDER CATH: CPT

## 2020-10-09 PROCEDURE — 96365 THER/PROPH/DIAG IV INF INIT: CPT

## 2020-10-09 PROCEDURE — 84100 ASSAY OF PHOSPHORUS: CPT

## 2020-10-09 PROCEDURE — 83605 ASSAY OF LACTIC ACID: CPT

## 2020-10-09 PROCEDURE — 87040 BLOOD CULTURE FOR BACTERIA: CPT

## 2020-10-09 PROCEDURE — 85025 COMPLETE CBC W/AUTO DIFF WBC: CPT

## 2020-10-09 PROCEDURE — 85610 PROTHROMBIN TIME: CPT

## 2020-10-09 PROCEDURE — 96375 TX/PRO/DX INJ NEW DRUG ADDON: CPT

## 2020-10-09 PROCEDURE — 97163 PT EVAL HIGH COMPLEX 45 MIN: CPT

## 2020-10-09 PROCEDURE — 83735 ASSAY OF MAGNESIUM: CPT

## 2020-10-09 PROCEDURE — 5A1D70Z PERFORMANCE OF URINARY FILTRATION, INTERMITTENT, LESS THAN 6 HOURS PER DAY: ICD-10-PCS | Performed by: INTERNAL MEDICINE

## 2020-10-09 RX ADMIN — BUDESONIDE SCH MG: 0.5 SUSPENSION RESPIRATORY (INHALATION) at 22:23

## 2020-10-09 RX ADMIN — DOXAZOSIN SCH MG: 2 TABLET ORAL at 21:12

## 2020-10-10 VITALS — DIASTOLIC BLOOD PRESSURE: 48 MMHG | SYSTOLIC BLOOD PRESSURE: 109 MMHG

## 2020-10-10 VITALS — DIASTOLIC BLOOD PRESSURE: 55 MMHG | SYSTOLIC BLOOD PRESSURE: 112 MMHG

## 2020-10-10 VITALS — SYSTOLIC BLOOD PRESSURE: 105 MMHG | DIASTOLIC BLOOD PRESSURE: 50 MMHG

## 2020-10-10 VITALS — DIASTOLIC BLOOD PRESSURE: 54 MMHG | SYSTOLIC BLOOD PRESSURE: 115 MMHG

## 2020-10-10 VITALS — SYSTOLIC BLOOD PRESSURE: 108 MMHG | DIASTOLIC BLOOD PRESSURE: 50 MMHG

## 2020-10-10 LAB
ALBUMIN SERPL-MCNC: 2.9 G/DL (ref 3.4–5)
ALP SERPL-CCNC: 66 U/L (ref 45–117)
ALT SERPL-CCNC: 17 U/L (ref 16–61)
ANION GAP SERPL CALCULATED.3IONS-SCNC: 7 MMOL/L (ref 5–15)
APTT PPP: 31.3 SEC (ref 23–31.2)
BILIRUB SERPL-MCNC: 0.5 MG/DL (ref 0.2–1)
BUN SERPL-MCNC: 92 MG/DL (ref 7–18)
BUN/CREAT SERPL: 13.6
CALCIUM SERPL-MCNC: 8.6 MG/DL (ref 8.5–10.1)
CHLORIDE SERPL-SCNC: 97 MMOL/L (ref 98–107)
CHOLEST SERPL-MCNC: 104 MG/DL (ref ?–200)
CO2 SERPL-SCNC: 30 MMOL/L (ref 21–32)
GLUCOSE SERPL-MCNC: 112 MG/DL (ref 74–106)
HCT VFR BLD AUTO: 33 % (ref 41–53)
HDLC SERPL-MCNC: 38 MG/DL (ref 40–59)
HGB BLD-MCNC: 10.5 G/DL (ref 13.5–17.5)
INR PPP: 1.07 (ref 0.9–1.15)
MAGNESIUM SERPL-MCNC: 3 MG/DL (ref 1.6–2.6)
MCH RBC QN AUTO: 27.9 PG (ref 28–32)
MCV RBC AUTO: 87.5 FL (ref 80–100)
NRBC BLD QL AUTO: 0 %
POTASSIUM SERPL-SCNC: 5.7 MMOL/L (ref 3.5–5.1)
PROT SERPL-MCNC: 7 G/DL (ref 6.4–8.2)
SODIUM SERPL-SCNC: 134 MMOL/L (ref 136–145)
TRIGL SERPL-MCNC: 107 MG/DL (ref ?–150)

## 2020-10-10 PROCEDURE — 5A1D70Z PERFORMANCE OF URINARY FILTRATION, INTERMITTENT, LESS THAN 6 HOURS PER DAY: ICD-10-PCS | Performed by: INTERNAL MEDICINE

## 2020-10-10 RX ADMIN — PANTOPRAZOLE SODIUM SCH MG: 40 TABLET, DELAYED RELEASE ORAL at 10:17

## 2020-10-10 RX ADMIN — DOXAZOSIN SCH MG: 2 TABLET ORAL at 21:50

## 2020-10-10 RX ADMIN — ALBUTEROL SULFATE PRN MG: 2.5 SOLUTION RESPIRATORY (INHALATION) at 07:17

## 2020-10-10 RX ADMIN — ENOXAPARIN SODIUM SCH MG: 30 INJECTION SUBCUTANEOUS at 10:17

## 2020-10-10 RX ADMIN — ASPIRIN SCH MG: 81 TABLET ORAL at 10:17

## 2020-10-10 RX ADMIN — HUMAN INSULIN SCH UNITS: 100 INJECTION, SOLUTION SUBCUTANEOUS at 22:42

## 2020-10-10 RX ADMIN — Medication SCH STRIP: at 17:03

## 2020-10-10 RX ADMIN — BUDESONIDE SCH MG: 0.5 SUSPENSION RESPIRATORY (INHALATION) at 07:18

## 2020-10-10 RX ADMIN — BUDESONIDE SCH MG: 0.5 SUSPENSION RESPIRATORY (INHALATION) at 22:24

## 2020-10-10 RX ADMIN — Medication SCH STRIP: at 21:50

## 2020-10-10 RX ADMIN — ALBUTEROL SULFATE SCH TAB: 90 AEROSOL, METERED RESPIRATORY (INHALATION) at 10:00

## 2020-10-10 RX ADMIN — HUMAN INSULIN SCH UNITS: 100 INJECTION, SOLUTION SUBCUTANEOUS at 17:04

## 2020-10-11 VITALS — SYSTOLIC BLOOD PRESSURE: 112 MMHG | DIASTOLIC BLOOD PRESSURE: 50 MMHG

## 2020-10-11 VITALS — SYSTOLIC BLOOD PRESSURE: 106 MMHG | DIASTOLIC BLOOD PRESSURE: 51 MMHG

## 2020-10-11 VITALS — DIASTOLIC BLOOD PRESSURE: 57 MMHG | SYSTOLIC BLOOD PRESSURE: 112 MMHG

## 2020-10-11 VITALS — SYSTOLIC BLOOD PRESSURE: 113 MMHG | DIASTOLIC BLOOD PRESSURE: 52 MMHG

## 2020-10-11 VITALS — DIASTOLIC BLOOD PRESSURE: 49 MMHG | SYSTOLIC BLOOD PRESSURE: 103 MMHG

## 2020-10-11 LAB
ANION GAP SERPL CALCULATED.3IONS-SCNC: 11 MMOL/L (ref 5–15)
BUN SERPL-MCNC: 113 MG/DL (ref 7–18)
BUN/CREAT SERPL: 15.2
CALCIUM SERPL-MCNC: 7.9 MG/DL (ref 8.5–10.1)
CHLORIDE SERPL-SCNC: 97 MMOL/L (ref 98–107)
CO2 SERPL-SCNC: 28 MMOL/L (ref 21–32)
GLUCOSE SERPL-MCNC: 148 MG/DL (ref 74–106)
POTASSIUM SERPL-SCNC: 4.8 MMOL/L (ref 3.5–5.1)
SODIUM SERPL-SCNC: 136 MMOL/L (ref 136–145)

## 2020-10-11 RX ADMIN — BUDESONIDE SCH MG: 0.5 SUSPENSION RESPIRATORY (INHALATION) at 22:34

## 2020-10-11 RX ADMIN — ASPIRIN SCH MG: 81 TABLET ORAL at 10:14

## 2020-10-11 RX ADMIN — DOXAZOSIN SCH MG: 2 TABLET ORAL at 22:08

## 2020-10-11 RX ADMIN — CEFTRIAXONE SODIUM SCH MLS/HR: 1 INJECTION, POWDER, FOR SOLUTION INTRAMUSCULAR; INTRAVENOUS at 10:13

## 2020-10-11 RX ADMIN — Medication SCH ML: at 17:38

## 2020-10-11 RX ADMIN — HUMAN INSULIN SCH UNITS: 100 INJECTION, SOLUTION SUBCUTANEOUS at 07:01

## 2020-10-11 RX ADMIN — SODIUM ZIRCONIUM CYCLOSILICATE SCH GM: 5 POWDER, FOR SUSPENSION ORAL at 10:14

## 2020-10-11 RX ADMIN — Medication SCH STRIP: at 22:07

## 2020-10-11 RX ADMIN — PANTOPRAZOLE SODIUM SCH MG: 40 TABLET, DELAYED RELEASE ORAL at 10:14

## 2020-10-11 RX ADMIN — Medication SCH STRIP: at 11:30

## 2020-10-11 RX ADMIN — HUMAN INSULIN SCH UNITS: 100 INJECTION, SOLUTION SUBCUTANEOUS at 12:33

## 2020-10-11 RX ADMIN — BUDESONIDE SCH MG: 0.5 SUSPENSION RESPIRATORY (INHALATION) at 10:00

## 2020-10-11 RX ADMIN — HUMAN INSULIN SCH UNITS: 100 INJECTION, SOLUTION SUBCUTANEOUS at 17:41

## 2020-10-11 RX ADMIN — HUMAN INSULIN SCH UNITS: 100 INJECTION, SOLUTION SUBCUTANEOUS at 22:31

## 2020-10-11 RX ADMIN — ENOXAPARIN SODIUM SCH MG: 30 INJECTION SUBCUTANEOUS at 10:14

## 2020-10-11 RX ADMIN — Medication SCH STRIP: at 17:00

## 2020-10-11 RX ADMIN — ALBUTEROL SULFATE SCH TAB: 90 AEROSOL, METERED RESPIRATORY (INHALATION) at 10:00

## 2020-10-11 RX ADMIN — Medication SCH STRIP: at 07:00

## 2020-10-12 VITALS — DIASTOLIC BLOOD PRESSURE: 51 MMHG | SYSTOLIC BLOOD PRESSURE: 121 MMHG

## 2020-10-12 VITALS — DIASTOLIC BLOOD PRESSURE: 51 MMHG | SYSTOLIC BLOOD PRESSURE: 104 MMHG

## 2020-10-12 VITALS — SYSTOLIC BLOOD PRESSURE: 121 MMHG | DIASTOLIC BLOOD PRESSURE: 47 MMHG

## 2020-10-12 VITALS — SYSTOLIC BLOOD PRESSURE: 110 MMHG | DIASTOLIC BLOOD PRESSURE: 42 MMHG

## 2020-10-12 LAB
ANION GAP SERPL CALCULATED.3IONS-SCNC: 12 MMOL/L (ref 5–15)
BUN SERPL-MCNC: 118 MG/DL (ref 7–18)
BUN/CREAT SERPL: 15.7
CALCIUM SERPL-MCNC: 8 MG/DL (ref 8.5–10.1)
CHLORIDE SERPL-SCNC: 97 MMOL/L (ref 98–107)
CO2 SERPL-SCNC: 27 MMOL/L (ref 21–32)
GLUCOSE SERPL-MCNC: 119 MG/DL (ref 74–106)
HCT VFR BLD AUTO: 28.9 % (ref 41–53)
HGB BLD-MCNC: 9.5 G/DL (ref 13.5–17.5)
MCH RBC QN AUTO: 28.3 PG (ref 28–32)
MCV RBC AUTO: 86.1 FL (ref 80–100)
NRBC BLD QL AUTO: 0 %
POTASSIUM SERPL-SCNC: 3.8 MMOL/L (ref 3.5–5.1)
SODIUM SERPL-SCNC: 136 MMOL/L (ref 136–145)

## 2020-10-12 PROCEDURE — 5A1D70Z PERFORMANCE OF URINARY FILTRATION, INTERMITTENT, LESS THAN 6 HOURS PER DAY: ICD-10-PCS | Performed by: INTERNAL MEDICINE

## 2020-10-12 RX ADMIN — Medication SCH STRIP: at 06:29

## 2020-10-12 RX ADMIN — HUMAN INSULIN SCH UNITS: 100 INJECTION, SOLUTION SUBCUTANEOUS at 06:29

## 2020-10-12 RX ADMIN — CEFTRIAXONE SODIUM SCH MLS/HR: 1 INJECTION, POWDER, FOR SOLUTION INTRAMUSCULAR; INTRAVENOUS at 09:53

## 2020-10-12 RX ADMIN — SODIUM ZIRCONIUM CYCLOSILICATE SCH GM: 5 POWDER, FOR SUSPENSION ORAL at 09:55

## 2020-10-12 RX ADMIN — ALBUTEROL SULFATE SCH TAB: 90 AEROSOL, METERED RESPIRATORY (INHALATION) at 10:00

## 2020-10-12 RX ADMIN — PANTOPRAZOLE SODIUM SCH MG: 40 TABLET, DELAYED RELEASE ORAL at 09:54

## 2020-10-12 RX ADMIN — ENOXAPARIN SODIUM SCH MG: 30 INJECTION SUBCUTANEOUS at 09:53

## 2020-10-12 RX ADMIN — ASPIRIN SCH MG: 81 TABLET ORAL at 09:53

## 2020-10-12 RX ADMIN — Medication SCH STRIP: at 11:41

## 2020-10-12 RX ADMIN — BUDESONIDE SCH MG: 0.5 SUSPENSION RESPIRATORY (INHALATION) at 10:10

## 2020-10-12 RX ADMIN — Medication SCH ML: at 12:00

## 2020-10-12 RX ADMIN — Medication SCH ML: at 10:03

## 2020-10-12 RX ADMIN — HUMAN INSULIN SCH UNITS: 100 INJECTION, SOLUTION SUBCUTANEOUS at 11:41

## 2020-10-12 RX ADMIN — ALBUTEROL SULFATE PRN MG: 2.5 SOLUTION RESPIRATORY (INHALATION) at 10:10
